# Patient Record
Sex: FEMALE | Race: AMERICAN INDIAN OR ALASKA NATIVE | NOT HISPANIC OR LATINO | Employment: UNEMPLOYED | URBAN - METROPOLITAN AREA
[De-identification: names, ages, dates, MRNs, and addresses within clinical notes are randomized per-mention and may not be internally consistent; named-entity substitution may affect disease eponyms.]

---

## 2022-08-15 PROBLEM — O26.893 RH NEGATIVE STATUS DURING PREGNANCY, THIRD TRIMESTER, SINGLE GESTATION: Status: ACTIVE | Noted: 2022-08-15

## 2022-08-15 PROBLEM — Z67.91 RH NEGATIVE STATUS DURING PREGNANCY, THIRD TRIMESTER, SINGLE GESTATION: Status: ACTIVE | Noted: 2022-08-15

## 2022-08-15 PROBLEM — O99.013 ANEMIA AFFECTING PREGNANCY IN THIRD TRIMESTER: Status: ACTIVE | Noted: 2022-08-15

## 2022-09-14 ENCOUNTER — HOSPITAL ENCOUNTER (EMERGENCY)
Facility: HOSPITAL | Age: 27
Discharge: PSYCHIATRIC OR SUBSTANCE USE FACILITY | End: 2022-09-14
Attending: STUDENT IN AN ORGANIZED HEALTH CARE EDUCATION/TRAINING PROGRAM
Payer: COMMERCIAL

## 2022-09-14 ENCOUNTER — HOSPITAL ENCOUNTER (INPATIENT)
Facility: HOSPITAL | Age: 27
LOS: 7 days | Discharge: HOME | DRG: 885 | End: 2022-09-21
Attending: STUDENT IN AN ORGANIZED HEALTH CARE EDUCATION/TRAINING PROGRAM | Admitting: STUDENT IN AN ORGANIZED HEALTH CARE EDUCATION/TRAINING PROGRAM
Payer: COMMERCIAL

## 2022-09-14 VITALS
BODY MASS INDEX: 36.29 KG/M2 | TEMPERATURE: 98 F | RESPIRATION RATE: 18 BRPM | OXYGEN SATURATION: 98 % | SYSTOLIC BLOOD PRESSURE: 141 MMHG | WEIGHT: 224.88 LBS | DIASTOLIC BLOOD PRESSURE: 76 MMHG | HEART RATE: 102 BPM

## 2022-09-14 DIAGNOSIS — F29 PSYCHOSIS, UNSPECIFIED PSYCHOSIS TYPE: Primary | ICD-10-CM

## 2022-09-14 DIAGNOSIS — Z00.8 MEDICAL CLEARANCE FOR PSYCHIATRIC ADMISSION: Primary | ICD-10-CM

## 2022-09-14 DIAGNOSIS — F30.9 MANIA: ICD-10-CM

## 2022-09-14 DIAGNOSIS — F23 ACUTE PSYCHOSIS: ICD-10-CM

## 2022-09-14 LAB
ALBUMIN SERPL BCP-MCNC: 3.6 G/DL (ref 3.5–5.2)
ALP SERPL-CCNC: 111 U/L (ref 55–135)
ALT SERPL W/O P-5'-P-CCNC: 77 U/L (ref 10–44)
AMPHET+METHAMPHET UR QL: NEGATIVE
ANION GAP SERPL CALC-SCNC: 9 MMOL/L (ref 8–16)
APAP SERPL-MCNC: <3 UG/ML (ref 10–20)
AST SERPL-CCNC: 46 U/L (ref 10–40)
B-HCG UR QL: NEGATIVE
BARBITURATES UR QL SCN>200 NG/ML: NEGATIVE
BASOPHILS # BLD AUTO: 0.08 K/UL (ref 0–0.2)
BASOPHILS NFR BLD: 0.7 % (ref 0–1.9)
BENZODIAZ UR QL SCN>200 NG/ML: NEGATIVE
BILIRUB SERPL-MCNC: 0.3 MG/DL (ref 0.1–1)
BILIRUB UR QL STRIP: NEGATIVE
BUN SERPL-MCNC: 14 MG/DL (ref 6–20)
BZE UR QL SCN: NEGATIVE
CALCIUM SERPL-MCNC: 9.2 MG/DL (ref 8.7–10.5)
CANNABINOIDS UR QL SCN: NEGATIVE
CHLORIDE SERPL-SCNC: 108 MMOL/L (ref 95–110)
CLARITY UR: CLEAR
CO2 SERPL-SCNC: 22 MMOL/L (ref 23–29)
COLOR UR: YELLOW
CREAT SERPL-MCNC: 0.8 MG/DL (ref 0.5–1.4)
CREAT UR-MCNC: 17.7 MG/DL (ref 15–325)
DIFFERENTIAL METHOD: ABNORMAL
EOSINOPHIL # BLD AUTO: 0.5 K/UL (ref 0–0.5)
EOSINOPHIL NFR BLD: 4.7 % (ref 0–8)
ERYTHROCYTE [DISTWIDTH] IN BLOOD BY AUTOMATED COUNT: 15.6 % (ref 11.5–14.5)
EST. GFR  (NO RACE VARIABLE): >60 ML/MIN/1.73 M^2
ESTIMATED AVG GLUCOSE: 100 MG/DL (ref 68–131)
ETHANOL SERPL-MCNC: <10 MG/DL
GLUCOSE SERPL-MCNC: 86 MG/DL (ref 70–110)
GLUCOSE UR QL STRIP: NEGATIVE
HBA1C MFR BLD: 5.1 % (ref 4–5.6)
HCT VFR BLD AUTO: 41.7 % (ref 37–48.5)
HGB BLD-MCNC: 13.2 G/DL (ref 12–16)
HGB UR QL STRIP: NEGATIVE
IMM GRANULOCYTES # BLD AUTO: 0.06 K/UL (ref 0–0.04)
IMM GRANULOCYTES NFR BLD AUTO: 0.5 % (ref 0–0.5)
KETONES UR QL STRIP: NEGATIVE
LEUKOCYTE ESTERASE UR QL STRIP: ABNORMAL
LYMPHOCYTES # BLD AUTO: 3.5 K/UL (ref 1–4.8)
LYMPHOCYTES NFR BLD: 31.2 % (ref 18–48)
MCH RBC QN AUTO: 27.7 PG (ref 27–31)
MCHC RBC AUTO-ENTMCNC: 31.7 G/DL (ref 32–36)
MCV RBC AUTO: 87 FL (ref 82–98)
METHADONE UR QL SCN>300 NG/ML: NEGATIVE
MICROSCOPIC COMMENT: NORMAL
MONOCYTES # BLD AUTO: 0.7 K/UL (ref 0.3–1)
MONOCYTES NFR BLD: 6.6 % (ref 4–15)
NEUTROPHILS # BLD AUTO: 6.3 K/UL (ref 1.8–7.7)
NEUTROPHILS NFR BLD: 56.3 % (ref 38–73)
NITRITE UR QL STRIP: NEGATIVE
NRBC BLD-RTO: 0 /100 WBC
OPIATES UR QL SCN: NEGATIVE
PCP UR QL SCN>25 NG/ML: NEGATIVE
PH UR STRIP: 6 [PH] (ref 5–8)
PLATELET # BLD AUTO: 269 K/UL (ref 150–450)
PMV BLD AUTO: 8.7 FL (ref 9.2–12.9)
POTASSIUM SERPL-SCNC: 4.1 MMOL/L (ref 3.5–5.1)
PROT SERPL-MCNC: 7.6 G/DL (ref 6–8.4)
PROT UR QL STRIP: NEGATIVE
RBC # BLD AUTO: 4.77 M/UL (ref 4–5.4)
RBC #/AREA URNS HPF: 1 /HPF (ref 0–4)
SALICYLATES SERPL-MCNC: <5 MG/DL (ref 15–30)
SARS-COV-2 RDRP RESP QL NAA+PROBE: NEGATIVE
SODIUM SERPL-SCNC: 139 MMOL/L (ref 136–145)
SP GR UR STRIP: <=1.005 (ref 1–1.03)
SQUAMOUS #/AREA URNS HPF: 2 /HPF
TOXICOLOGY INFORMATION: NORMAL
TSH SERPL DL<=0.005 MIU/L-ACNC: 1.33 UIU/ML (ref 0.4–4)
URN SPEC COLLECT METH UR: ABNORMAL
UROBILINOGEN UR STRIP-ACNC: NEGATIVE EU/DL
VALPROATE SERPL-MCNC: <12.5 UG/ML (ref 50–100)
WBC # BLD AUTO: 11.17 K/UL (ref 3.9–12.7)
WBC #/AREA URNS HPF: 2 /HPF (ref 0–5)

## 2022-09-14 PROCEDURE — 80179 DRUG ASSAY SALICYLATE: CPT | Performed by: STUDENT IN AN ORGANIZED HEALTH CARE EDUCATION/TRAINING PROGRAM

## 2022-09-14 PROCEDURE — 83036 HEMOGLOBIN GLYCOSYLATED A1C: CPT | Performed by: STUDENT IN AN ORGANIZED HEALTH CARE EDUCATION/TRAINING PROGRAM

## 2022-09-14 PROCEDURE — 80053 COMPREHEN METABOLIC PANEL: CPT | Performed by: STUDENT IN AN ORGANIZED HEALTH CARE EDUCATION/TRAINING PROGRAM

## 2022-09-14 PROCEDURE — 11400000 HC PSYCH PRIVATE ROOM

## 2022-09-14 PROCEDURE — 99285 EMERGENCY DEPT VISIT HI MDM: CPT | Mod: 25

## 2022-09-14 PROCEDURE — 80061 LIPID PANEL: CPT | Performed by: STUDENT IN AN ORGANIZED HEALTH CARE EDUCATION/TRAINING PROGRAM

## 2022-09-14 PROCEDURE — 85025 COMPLETE CBC W/AUTO DIFF WBC: CPT | Performed by: STUDENT IN AN ORGANIZED HEALTH CARE EDUCATION/TRAINING PROGRAM

## 2022-09-14 PROCEDURE — 80143 DRUG ASSAY ACETAMINOPHEN: CPT | Performed by: STUDENT IN AN ORGANIZED HEALTH CARE EDUCATION/TRAINING PROGRAM

## 2022-09-14 PROCEDURE — 25000003 PHARM REV CODE 250: Performed by: STUDENT IN AN ORGANIZED HEALTH CARE EDUCATION/TRAINING PROGRAM

## 2022-09-14 PROCEDURE — 81000 URINALYSIS NONAUTO W/SCOPE: CPT | Mod: 59 | Performed by: STUDENT IN AN ORGANIZED HEALTH CARE EDUCATION/TRAINING PROGRAM

## 2022-09-14 PROCEDURE — 81025 URINE PREGNANCY TEST: CPT | Performed by: STUDENT IN AN ORGANIZED HEALTH CARE EDUCATION/TRAINING PROGRAM

## 2022-09-14 PROCEDURE — 63600175 PHARM REV CODE 636 W HCPCS: Performed by: STUDENT IN AN ORGANIZED HEALTH CARE EDUCATION/TRAINING PROGRAM

## 2022-09-14 PROCEDURE — 84443 ASSAY THYROID STIM HORMONE: CPT | Performed by: STUDENT IN AN ORGANIZED HEALTH CARE EDUCATION/TRAINING PROGRAM

## 2022-09-14 PROCEDURE — 80307 DRUG TEST PRSMV CHEM ANLYZR: CPT | Performed by: STUDENT IN AN ORGANIZED HEALTH CARE EDUCATION/TRAINING PROGRAM

## 2022-09-14 PROCEDURE — 36415 COLL VENOUS BLD VENIPUNCTURE: CPT | Performed by: STUDENT IN AN ORGANIZED HEALTH CARE EDUCATION/TRAINING PROGRAM

## 2022-09-14 PROCEDURE — 96372 THER/PROPH/DIAG INJ SC/IM: CPT | Performed by: STUDENT IN AN ORGANIZED HEALTH CARE EDUCATION/TRAINING PROGRAM

## 2022-09-14 PROCEDURE — 82077 ASSAY SPEC XCP UR&BREATH IA: CPT | Performed by: STUDENT IN AN ORGANIZED HEALTH CARE EDUCATION/TRAINING PROGRAM

## 2022-09-14 PROCEDURE — U0002 COVID-19 LAB TEST NON-CDC: HCPCS | Performed by: STUDENT IN AN ORGANIZED HEALTH CARE EDUCATION/TRAINING PROGRAM

## 2022-09-14 PROCEDURE — 80164 ASSAY DIPROPYLACETIC ACD TOT: CPT | Performed by: STUDENT IN AN ORGANIZED HEALTH CARE EDUCATION/TRAINING PROGRAM

## 2022-09-14 RX ORDER — THIAMINE HCL 100 MG
100 TABLET ORAL DAILY
Status: DISCONTINUED | OUTPATIENT
Start: 2022-09-14 | End: 2022-09-21 | Stop reason: HOSPADM

## 2022-09-14 RX ORDER — LORAZEPAM 1 MG/1
2 TABLET ORAL EVERY 4 HOURS PRN
Status: DISCONTINUED | OUTPATIENT
Start: 2022-09-14 | End: 2022-09-16

## 2022-09-14 RX ORDER — QUETIAPINE FUMARATE 300 MG/1
300 TABLET, FILM COATED ORAL NIGHTLY
Status: ON HOLD | COMMUNITY
Start: 2022-09-07 | End: 2022-09-21 | Stop reason: HOSPADM

## 2022-09-14 RX ORDER — ZOLPIDEM TARTRATE 10 MG/1
10 TABLET ORAL NIGHTLY
Status: ON HOLD | COMMUNITY
Start: 2022-09-07 | End: 2022-09-21 | Stop reason: HOSPADM

## 2022-09-14 RX ORDER — HALOPERIDOL 5 MG/1
5 TABLET ORAL 2 TIMES DAILY
Status: ON HOLD | COMMUNITY
Start: 2022-09-09 | End: 2022-09-21 | Stop reason: HOSPADM

## 2022-09-14 RX ORDER — SERTRALINE HYDROCHLORIDE 50 MG/1
TABLET, FILM COATED ORAL
Status: ON HOLD | COMMUNITY
Start: 2022-08-22 | End: 2022-09-21 | Stop reason: HOSPADM

## 2022-09-14 RX ORDER — HALOPERIDOL 5 MG/ML
5 INJECTION INTRAMUSCULAR EVERY 4 HOURS PRN
Status: DISCONTINUED | OUTPATIENT
Start: 2022-09-14 | End: 2022-09-16

## 2022-09-14 RX ORDER — CHLORPROMAZINE HYDROCHLORIDE 50 MG/1
1 TABLET, FILM COATED ORAL EVERY MORNING
Status: ON HOLD | COMMUNITY
Start: 2022-09-08 | End: 2022-09-21 | Stop reason: HOSPADM

## 2022-09-14 RX ORDER — DIVALPROEX SODIUM 500 MG/1
500 TABLET, DELAYED RELEASE ORAL 3 TIMES DAILY
Status: ON HOLD | COMMUNITY
Start: 2022-09-07 | End: 2022-09-21 | Stop reason: HOSPADM

## 2022-09-14 RX ORDER — IBUPROFEN 200 MG
1 TABLET ORAL DAILY PRN
Status: DISCONTINUED | OUTPATIENT
Start: 2022-09-14 | End: 2022-09-21 | Stop reason: HOSPADM

## 2022-09-14 RX ORDER — HYDROXYZINE HYDROCHLORIDE 25 MG/1
50 TABLET, FILM COATED ORAL NIGHTLY PRN
Status: DISCONTINUED | OUTPATIENT
Start: 2022-09-14 | End: 2022-09-21 | Stop reason: HOSPADM

## 2022-09-14 RX ORDER — CALCIUM CARBONATE 200(500)MG
1000 TABLET,CHEWABLE ORAL EVERY 8 HOURS PRN
Status: DISCONTINUED | OUTPATIENT
Start: 2022-09-14 | End: 2022-09-21 | Stop reason: HOSPADM

## 2022-09-14 RX ORDER — HALOPERIDOL 5 MG/ML
5 INJECTION INTRAMUSCULAR EVERY 4 HOURS PRN
Status: DISCONTINUED | OUTPATIENT
Start: 2022-09-14 | End: 2022-09-14 | Stop reason: HOSPADM

## 2022-09-14 RX ORDER — DIPHENHYDRAMINE HYDROCHLORIDE 50 MG/ML
50 INJECTION INTRAMUSCULAR; INTRAVENOUS EVERY 4 HOURS PRN
Status: DISCONTINUED | OUTPATIENT
Start: 2022-09-14 | End: 2022-09-14

## 2022-09-14 RX ORDER — PAROXETINE HYDROCHLORIDE 20 MG/1
20 TABLET, FILM COATED ORAL DAILY
Status: ON HOLD | COMMUNITY
Start: 2022-09-07 | End: 2022-09-21 | Stop reason: HOSPADM

## 2022-09-14 RX ORDER — ADHESIVE BANDAGE
30 BANDAGE TOPICAL DAILY PRN
Status: DISCONTINUED | OUTPATIENT
Start: 2022-09-14 | End: 2022-09-21 | Stop reason: HOSPADM

## 2022-09-14 RX ORDER — LORAZEPAM 2 MG/ML
2 INJECTION INTRAMUSCULAR EVERY 4 HOURS PRN
Status: DISCONTINUED | OUTPATIENT
Start: 2022-09-14 | End: 2022-09-14 | Stop reason: HOSPADM

## 2022-09-14 RX ORDER — HALOPERIDOL 5 MG/1
5 TABLET ORAL EVERY 4 HOURS PRN
Status: DISCONTINUED | OUTPATIENT
Start: 2022-09-14 | End: 2022-09-16

## 2022-09-14 RX ORDER — DIPHENHYDRAMINE HYDROCHLORIDE 50 MG/ML
50 INJECTION INTRAMUSCULAR; INTRAVENOUS EVERY 4 HOURS PRN
Status: DISCONTINUED | OUTPATIENT
Start: 2022-09-14 | End: 2022-09-14 | Stop reason: HOSPADM

## 2022-09-14 RX ORDER — FOLIC ACID 1 MG/1
1 TABLET ORAL DAILY
Status: DISCONTINUED | OUTPATIENT
Start: 2022-09-14 | End: 2022-09-21 | Stop reason: HOSPADM

## 2022-09-14 RX ORDER — DIPHENHYDRAMINE HCL 50 MG
50 CAPSULE ORAL EVERY 4 HOURS PRN
Status: DISCONTINUED | OUTPATIENT
Start: 2022-09-14 | End: 2022-09-16

## 2022-09-14 RX ORDER — HALOPERIDOL 5 MG/ML
5 INJECTION INTRAMUSCULAR EVERY 4 HOURS PRN
Status: DISCONTINUED | OUTPATIENT
Start: 2022-09-14 | End: 2022-09-14

## 2022-09-14 RX ORDER — LORAZEPAM 2 MG/ML
2 INJECTION INTRAMUSCULAR EVERY 4 HOURS PRN
Status: DISCONTINUED | OUTPATIENT
Start: 2022-09-14 | End: 2022-09-16

## 2022-09-14 RX ORDER — DIPHENHYDRAMINE HYDROCHLORIDE 50 MG/ML
50 INJECTION INTRAMUSCULAR; INTRAVENOUS EVERY 4 HOURS PRN
Status: DISCONTINUED | OUTPATIENT
Start: 2022-09-14 | End: 2022-09-16

## 2022-09-14 RX ADMIN — HYDROXYZINE HYDROCHLORIDE 50 MG: 25 TABLET ORAL at 08:09

## 2022-09-14 RX ADMIN — Medication 100 MG: at 03:09

## 2022-09-14 RX ADMIN — LORAZEPAM 2 MG: 2 INJECTION INTRAMUSCULAR; INTRAVENOUS at 11:09

## 2022-09-14 RX ADMIN — DIPHENHYDRAMINE HYDROCHLORIDE 50 MG: 50 INJECTION INTRAMUSCULAR; INTRAVENOUS at 11:09

## 2022-09-14 RX ADMIN — FOLIC ACID 1 MG: 1 TABLET ORAL at 03:09

## 2022-09-14 RX ADMIN — HALOPERIDOL LACTATE 5 MG: 5 INJECTION, SOLUTION INTRAMUSCULAR at 11:09

## 2022-09-14 RX ADMIN — THERA TABS 1 TABLET: TAB at 03:09

## 2022-09-14 NOTE — PLAN OF CARE
"Admit Note:  Pt received from [facility] ER, pec'd.  Pt escorted to u by security and tech via wheelchair.  Pt wanded and ambulatory on unit.  VSS.  Skin assessment/body search complete, nothing to report.  Pt calm and cooperative during admissions assessment.  States she does not understand why she is here and wants to leave, but pt was easily redirected after explaining pec process.  Pt states she was in an argument with her sister after a family member just recently , and she said some things that some of her family thought was "weird".  Stated she just made a comment about death and it was taken the wrong way.  Pt does have a long psych hx with previous inpatient admits.  Pt recently had a baby and has been off most of her previous psych meds.  Pt had agitated behavior in the ER and was given IM meds for agitation.  Somewhat subdued on u but alert and oriented.  Pt singed all paperwork.  Explained rules of unit and instructed pt to inform staff of any needs or concerns, understanding verbalized.  Pt oriented to unit.  Will continue to monitor.  "

## 2022-09-14 NOTE — ED NOTES
Pt requiring frequent redirection, patient requiring multiple staff members present for elopement precautions, pt yelling profanity, unable to redirect, md notified, new orders noted.

## 2022-09-14 NOTE — ED NOTES
Pt  transported to [facility] via , report given  to [place] [facility].  Pct and security present for transport and belongings sent with PCT.  NAD.

## 2022-09-14 NOTE — ED PROVIDER NOTES
"Encounter Date: 2022    This document was partially completed using speech recognition software and may contain misspellings, grammatical errors, and/or unexpected word substitutions.       History     Chief Complaint   Patient presents with    Psychiatric Evaluation     Pt reports "Im tired of people calling me crazy and I just need to prove I'm not". Pt states "I need to get out of the system". Pt with flight of ideas in triage.      27 year old female , last delivered last month presents to the ED with her father for psychosis. Starting a month ago after she delivered, she started acting very erratic. Not sleeping, not eating/drinking much, talking randomness, difficult to interact with. She has been taking care of her  per dad but he expresses concerns that she takes him on walks next to the road. This occurred after her 2nd pregnancy and she had to be hospitalized for 3 months per mom. Dad also states she was wishing someone else would die today but didn't make a plan to harm them.    The patient is alert, oriented. Claiming to be part of the [place] [employer] of [place]. Agitated, not cooperative, hyperverbal, stating "fu** the sytem" and that she needs to leave.     Review of patient's allergies indicates:  No Known Allergies  History reviewed. No pertinent past medical history.  History reviewed. No pertinent surgical history.  History reviewed. No pertinent family history.  Social History     Tobacco Use    Smoking status: Never    Smokeless tobacco: Never   Substance Use Topics    Alcohol use: Not Currently     Review of Systems   Unable to perform ROS: Psychiatric disorder     Physical Exam     Initial Vitals [22 1044]   BP Pulse Resp Temp SpO2   (!) 141/76 102 18 98.4 °F (36.9 °C) 98 %      MAP       --         Physical Exam    Nursing note and vitals reviewed.  Constitutional: She appears well-developed. She is not diaphoretic. No distress.   HENT:   Head: Normocephalic and atraumatic. "   Right Ear: External ear normal.   Left Ear: External ear normal.   Eyes: Right eye exhibits no discharge. Left eye exhibits no discharge. No scleral icterus.   Neck: Neck supple.   Cardiovascular:  Normal rate and regular rhythm.           Pulmonary/Chest: Breath sounds normal. No stridor. No respiratory distress. She has no wheezes. She has no rhonchi. She has no rales.   Abdominal: Abdomen is soft. There is no abdominal tenderness. There is no guarding.   Musculoskeletal:         General: No edema.      Cervical back: Neck supple.     Neurological: She is alert and oriented to person, place, and time.   Skin: Skin is warm and dry. Capillary refill takes less than 2 seconds.   Psychiatric: Her affect is angry. Her speech is rapid and/or pressured. She is agitated, aggressive and hyperactive. Thought content is paranoid. Cognition and memory are impaired. She expresses homicidal ideation. She expresses no suicidal ideation. She expresses no suicidal plans and no homicidal plans.       ED Course   Procedures  Labs Reviewed   URINALYSIS, REFLEX TO URINE CULTURE - Abnormal; Notable for the following components:       Result Value    Specific Gravity, UA <=1.005 (*)     Leukocytes, UA Trace (*)     All other components within normal limits    Narrative:     Specimen Source->Urine   ACETAMINOPHEN LEVEL - Abnormal; Notable for the following components:    Acetaminophen (Tylenol), Serum <3.0 (*)     All other components within normal limits   COMPREHENSIVE METABOLIC PANEL - Abnormal; Notable for the following components:    CO2 22 (*)     AST 46 (*)     ALT 77 (*)     All other components within normal limits   CBC W/ AUTO DIFFERENTIAL - Abnormal; Notable for the following components:    MCHC 31.7 (*)     RDW 15.6 (*)     MPV 8.7 (*)     Immature Grans (Abs) 0.06 (*)     All other components within normal limits   SALICYLATE LEVEL - Abnormal; Notable for the following components:    Salicylate Lvl <5.0 (*)     All other  components within normal limits   VALPROIC ACID - Abnormal; Notable for the following components:    Valproic Acid Level <12.5 (*)     All other components within normal limits   ALCOHOL,MEDICAL (ETHANOL)   TSH   DRUG SCREEN PANEL, URINE EMERGENCY    Narrative:     Specimen Source->Urine   PREGNANCY TEST, URINE RAPID    Narrative:     Specimen Source->Urine   SARS-COV-2 RNA AMPLIFICATION, QUAL   URINALYSIS MICROSCOPIC    Narrative:     Specimen Source->Urine          Imaging Results    None          Medications   lorazepam injection 2 mg (2 mg Intramuscular Not Given 22 1127)   haloperidol lactate injection 5 mg (5 mg Intramuscular Given 22 1110)   diphenhydrAMINE injection 50 mg (50 mg Intramuscular Given 22 1114)     Medical Decision Making:   Differential Diagnosis:   Ddx: postpartum psychosis, grave disability, SI, HI  ED Management:  Based on the patient's evaluation - patient appears to have psychosis and manic and meets PEC criteria for grave disability. Father agrees. The  child is formula milk fed and is with the father and mother-in-law and will care for the child. Patient is very upset with being PEC but I informed her that she will be going to inpatient psych.               Medically cleared for psychiatry placement: 2022  1:01 PM         Clinical Impression:   Final diagnoses:  [Z00.8] Medical clearance for psychiatric admission (Primary)  [F23] Acute psychosis  [O99.345, F53.1] Postpartum psychosis      ED Disposition Condition    Transfer to Psych Facility Stable          ED Prescriptions    None       Follow-up Information    None          Beto Pacheco DO  [id number]

## 2022-09-15 PROBLEM — F29 PSYCHOSIS: Status: ACTIVE | Noted: 2022-09-15

## 2022-09-15 LAB
CHOLEST SERPL-MCNC: 201 MG/DL (ref 120–199)
CHOLEST/HDLC SERPL: 4.3 {RATIO} (ref 2–5)
HDLC SERPL-MCNC: 47 MG/DL (ref 40–75)
HDLC SERPL: 23.4 % (ref 20–50)
LDLC SERPL CALC-MCNC: 121.6 MG/DL (ref 63–159)
NONHDLC SERPL-MCNC: 154 MG/DL
TRIGL SERPL-MCNC: 162 MG/DL (ref 30–150)

## 2022-09-15 PROCEDURE — 99223 PR INITIAL HOSPITAL CARE,LEVL III: ICD-10-PCS | Mod: ,,, | Performed by: STUDENT IN AN ORGANIZED HEALTH CARE EDUCATION/TRAINING PROGRAM

## 2022-09-15 PROCEDURE — 25000003 PHARM REV CODE 250: Performed by: STUDENT IN AN ORGANIZED HEALTH CARE EDUCATION/TRAINING PROGRAM

## 2022-09-15 PROCEDURE — 99232 SBSQ HOSP IP/OBS MODERATE 35: CPT | Mod: ,,, | Performed by: NURSE PRACTITIONER

## 2022-09-15 PROCEDURE — 99222 PR INITIAL HOSPITAL CARE,LEVL II: ICD-10-PCS | Mod: ,,, | Performed by: NURSE PRACTITIONER

## 2022-09-15 PROCEDURE — 36415 COLL VENOUS BLD VENIPUNCTURE: CPT | Performed by: STUDENT IN AN ORGANIZED HEALTH CARE EDUCATION/TRAINING PROGRAM

## 2022-09-15 PROCEDURE — 99222 1ST HOSP IP/OBS MODERATE 55: CPT | Mod: ,,, | Performed by: NURSE PRACTITIONER

## 2022-09-15 PROCEDURE — 99232 PR SUBSEQUENT HOSPITAL CARE,LEVL II: ICD-10-PCS | Mod: ,,, | Performed by: NURSE PRACTITIONER

## 2022-09-15 PROCEDURE — 99223 1ST HOSP IP/OBS HIGH 75: CPT | Mod: ,,, | Performed by: STUDENT IN AN ORGANIZED HEALTH CARE EDUCATION/TRAINING PROGRAM

## 2022-09-15 PROCEDURE — 11400000 HC PSYCH PRIVATE ROOM

## 2022-09-15 RX ORDER — QUETIAPINE FUMARATE 200 MG/1
400 TABLET, FILM COATED ORAL NIGHTLY
Status: DISCONTINUED | OUTPATIENT
Start: 2022-09-15 | End: 2022-09-16

## 2022-09-15 RX ORDER — HYDROCORTISONE 25 MG/G
CREAM TOPICAL 2 TIMES DAILY
Status: DISCONTINUED | OUTPATIENT
Start: 2022-09-15 | End: 2022-09-21 | Stop reason: HOSPADM

## 2022-09-15 RX ADMIN — HYDROCORTISONE 2.5%: 25 CREAM TOPICAL at 02:09

## 2022-09-15 RX ADMIN — HYDROCORTISONE 2.5%: 25 CREAM TOPICAL at 08:09

## 2022-09-15 RX ADMIN — Medication 100 MG: at 08:09

## 2022-09-15 RX ADMIN — QUETIAPINE FUMARATE 400 MG: 200 TABLET ORAL at 08:09

## 2022-09-15 RX ADMIN — THERA TABS 1 TABLET: TAB at 08:09

## 2022-09-15 RX ADMIN — FOLIC ACID 1 MG: 1 TABLET ORAL at 08:09
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
## 2022-09-15 NOTE — PLAN OF CARE
"Asked female tech "Where's the exits at"?  Calm when she asked.   Interacting with peers.  Accepted snacks.  Prn hydroxyzine given at 20:07 to assist with insomnia.  Complained about hemorrhoids.  Modified VC maintained.    "

## 2022-09-15 NOTE — PLAN OF CARE
"  Problem: Adult Behavioral Health Plan of Care  Goal: Optimized Coping Skills in Response to Life Stressors  Outcome: Ongoing, Progressing  Intervention: Promote Effective Coping Strategies  Flowsheets (Taken 9/15/2022 5579)  Supportive Measures:   active listening utilized   positive reinforcement provided   counseling provided   verbalization of feelings encouraged   Group Note:  Behavior:  PLPC met with patient for an individual psychotherapy group. Pt affect elevated with dysphoric mood.     Intervention: PLPC discussed with patient on discharge planning. PLPC encouraged patient to identify ways to cope with and reflect on what are the plans when they are discharged ?    Response:  Pt stated  I am going back home to take care of my baby".    Plan: Continue to encourage group attendance in future group sessions.  "

## 2022-09-15 NOTE — SUBJECTIVE & OBJECTIVE
No past medical history on file.    No past surgical history on file.    Review of patient's allergies indicates:  No Known Allergies    Current Facility-Administered Medications on File Prior to Encounter   Medication    [DISCONTINUED] diphenhydrAMINE injection 50 mg    [DISCONTINUED] haloperidol lactate injection 5 mg    [DISCONTINUED] lorazepam injection 2 mg     Current Outpatient Medications on File Prior to Encounter   Medication Sig    chlorproMAZINE (THORAZINE) 50 MG tablet Take 1 tablet by mouth every morning.    divalproex (DEPAKOTE) 500 MG TbEC Take 500 mg by mouth 3 (three) times daily.    haloperidoL (HALDOL) 5 MG tablet Take 5 mg by mouth 2 (two) times daily.    paroxetine (PAXIL) 20 MG tablet Take 20 mg by mouth once daily.    QUEtiapine (SEROQUEL) 300 MG Tab Take 300 mg by mouth every evening.    sertraline (ZOLOFT) 50 MG tablet     zolpidem (AMBIEN) 10 mg Tab Take 10 mg by mouth every evening.     Family History    None       Tobacco Use    Smoking status: Never    Smokeless tobacco: Never   Substance and Sexual Activity    Alcohol use: Not Currently    Drug use: Not on file    Sexual activity: Yes     Partners: Male     Review of Systems   Constitutional:  Negative for chills, fatigue, fever and unexpected weight change.   HENT:  Negative for congestion, ear pain, sore throat and trouble swallowing.    Eyes:  Negative for pain and visual disturbance.   Respiratory:  Negative for cough, chest tightness and shortness of breath.    Cardiovascular:  Negative for chest pain, palpitations and leg swelling.   Gastrointestinal:  Negative for abdominal distention, abdominal pain, constipation, diarrhea and vomiting.   Genitourinary:  Negative for difficulty urinating, dysuria, flank pain, frequency and hematuria.   Musculoskeletal:  Negative for back pain, gait problem, joint swelling, neck pain and neck stiffness.   Skin:  Negative for rash and wound.   Neurological:  Negative for dizziness, seizures,  speech difficulty, light-headedness and headaches.   Psychiatric/Behavioral:  Positive for decreased concentration.    Objective:     Vital Signs (Most Recent):  Temp: 96.7 °F (35.9 °C) (09/15/22 0722)  Pulse: 70 (09/15/22 0722)  Resp: 16 (09/15/22 0722)  BP: (!) 116/58 (09/15/22 0722)   Vital Signs (24h Range):  Temp:  [96.7 °F (35.9 °C)-97.4 °F (36.3 °C)] 96.7 °F (35.9 °C)  Pulse:  [63-70] 70  Resp:  [16-18] 16  BP: (115-120)/(58-64) 116/58     Weight: 101.9 kg (224 lb 12.1 oz)  Body mass index is 36.28 kg/m².    Physical Exam  Vitals reviewed.   Constitutional:       Appearance: She is well-developed.   HENT:      Head: Normocephalic and atraumatic.   Neck:      Thyroid: No thyromegaly.   Cardiovascular:      Rate and Rhythm: Normal rate and regular rhythm.      Heart sounds: Normal heart sounds. No murmur heard.  Pulmonary:      Effort: Pulmonary effort is normal. No respiratory distress.      Breath sounds: Normal breath sounds. No wheezing or rales.   Abdominal:      General: Bowel sounds are normal. There is no distension.      Palpations: Abdomen is soft. There is no mass.      Tenderness: There is no abdominal tenderness.   Musculoskeletal:         General: Normal range of motion.   Lymphadenopathy:      Cervical: No cervical adenopathy.   Skin:     General: Skin is warm and dry.      Findings: No erythema.   Neurological:      Mental Status: She is alert and oriented to person, place, and time.      Comments:   Neuro: Cranial nerves:  CN II Visual fields full to confrontation.   CN III, IV, VI Pupils are equal, round, and reactive to light.  CN III: no palsy  Nystagmus: none   Diplopia: none  Ophthalmoparesis: none  CN V Facial sensation intact.   CN VII Facial expression full, symmetric.   CN VIII normal.   CN IX normal.   CN X normal.   CN XI normal.   CN XII normal.             Significant Labs:   UPT negative     U/A  trace leuko    UDS  Results for orders placed or performed during the hospital  encounter of 09/14/22   Drug screen panel, emergency   Result Value Ref Range    Benzodiazepines Negative Negative    Methadone metabolites Negative Negative    Cocaine (Metab.) Negative Negative    Opiate Scrn, Ur Negative Negative    Barbiturate Screen, Ur Negative Negative    Amphetamine Screen, Ur Negative Negative    THC Negative Negative    Phencyclidine Negative Negative    Creatinine, Urine 17.7 15.0 - 325.0 mg/dL    Toxicology Information SEE COMMENT      CBC  Results for orders placed or performed during the hospital encounter of 09/14/22   CBC auto differential   Result Value Ref Range    WBC 11.17 3.90 - 12.70 K/uL    RBC 4.77 4.00 - 5.40 M/uL    Hemoglobin 13.2 12.0 - 16.0 g/dL    Hematocrit 41.7 37.0 - 48.5 %    MCV 87 82 - 98 fL    MCH 27.7 27.0 - 31.0 pg    MCHC 31.7 (L) 32.0 - 36.0 g/dL    RDW 15.6 (H) 11.5 - 14.5 %    Platelets 269 150 - 450 K/uL    MPV 8.7 (L) 9.2 - 12.9 fL    Immature Granulocytes 0.5 0.0 - 0.5 %    Gran # (ANC) 6.3 1.8 - 7.7 K/uL    Immature Grans (Abs) 0.06 (H) 0.00 - 0.04 K/uL    Lymph # 3.5 1.0 - 4.8 K/uL    Mono # 0.7 0.3 - 1.0 K/uL    Eos # 0.5 0.0 - 0.5 K/uL    Baso # 0.08 0.00 - 0.20 K/uL    nRBC 0 0 /100 WBC    Gran % 56.3 38.0 - 73.0 %    Lymph % 31.2 18.0 - 48.0 %    Mono % 6.6 4.0 - 15.0 %    Eosinophil % 4.7 0.0 - 8.0 %    Basophil % 0.7 0.0 - 1.9 %    Differential Method Automated      CMP  Results for orders placed or performed during the hospital encounter of 09/14/22   Comprehensive metabolic panel   Result Value Ref Range    Sodium 139 136 - 145 mmol/L    Potassium 4.1 3.5 - 5.1 mmol/L    Chloride 108 95 - 110 mmol/L    CO2 22 (L) 23 - 29 mmol/L    Glucose 86 70 - 110 mg/dL    BUN 14 6 - 20 mg/dL    Creatinine 0.8 0.5 - 1.4 mg/dL    Calcium 9.2 8.7 - 10.5 mg/dL    Total Protein 7.6 6.0 - 8.4 g/dL    Albumin 3.6 3.5 - 5.2 g/dL    Total Bilirubin 0.3 0.1 - 1.0 mg/dL    Alkaline Phosphatase 111 55 - 135 U/L    AST 46 (H) 10 - 40 U/L    ALT 77 (H) 10 - 44 U/L     Anion Gap 9 8 - 16 mmol/L    eGFR >60 >60 mL/min/1.73 m^2     TSH  Results for orders placed or performed during the hospital encounter of 09/14/22   TSH   Result Value Ref Range    TSH 1.328 0.400 - 4.000 uIU/mL     ETOH  Results for orders placed or performed during the hospital encounter of 09/14/22   Ethanol   Result Value Ref Range    Alcohol, Serum <10 <10 mg/dL     Salicylate  Results for orders placed or performed during the hospital encounter of 09/14/22   Salicylate level   Result Value Ref Range    Salicylate Lvl <5.0 (L) 15.0 - 30.0 mg/dL     Acetaminophen  Results for orders placed or performed during the hospital encounter of 09/14/22   Acetaminophen level   Result Value Ref Range    Acetaminophen (Tylenol), Serum <3.0 (L) 10.0 - 20.0 ug/mL

## 2022-09-15 NOTE — NURSING
Rested intermittently with closed eyes and even resp for 5 hours.  Modified VC and all precautions maintained.  Pathways clear and bed in low position.

## 2022-09-15 NOTE — PROGRESS NOTES
"   09/15/22 1525   Assessment   Patient's Identification of the Problem Patient presents calm, cooperative, reports "calm, relaxed, collective" mood, states her admit is due to "I'm a manic bipolar. I just had my baby on August 14th, everything went wrong. It'a kind of like a switch inside my brain made me so angry. I just felt like everyone was trying to take my son away from me... It's safer for my family if I'm here cause I do things when I'm angry. I act irrational... The fact that I can't breast feed makes me so angry." Patient denies alcohol or drug use. Patient reports she is , have 3 children, has an Associate Degree in General Studies, is unemployed, wears glasses, lives in [place] with her family. Patient verbalized her main goals "I just want to go home to my family and go back to [facility]."   Leisure Interest Video Games;Sit Outside;Enjoy Family/Friends;Listen to Music;Uatsdin  (play with the dog)   Leisure Barriers Lack of Finances   Treatment Focus To Improve Mood;To Promote Successful and Safe Self Expression;To Improve Coping Skills;To Improve Leisure Awareness/Lifestyle/Interest     Treatment Recommendation:   1:1 Intervention (as needed)    Cognitive Stimulation Skilled Activity  Self Expression Skilled Activity  Mild Exercises Skilled Activity  Stress Management Skilled Activity  Coping Skilled Activity  Leisure Education and Awareness Skilled Activity    Treatment Goal(s):  Long Term Goals Refer To Master Treatment Plan    Short Term Treatment Goal(s)  Patient Will:  Comply with Treatment  Exhibit Improvement in Mood  Demonstrate Constructive Expression of Feelings and Behavior  Verbalize Improvement in Mood    Discharge Recommendations:  Encourage Patient to Actively Utilize Available Community Resources to Increase Leisure Involvement to Decrease Signs and Symptoms of Illness  Encourage Patient to Utilize Coping Skills on a Regular Basis to Reduce the Risk of Decompensating and " Re-Hospitalizations  Follow Up with After Care Appointments  Continue with Current Leisure Activities

## 2022-09-15 NOTE — PLAN OF CARE
Pt is somewhat restless and elevated but redirectable.  She denies any S/I or H/I at this time.  Pt has little insight into illness.  Pt does not really want to take meds in order to breastfeed.  Pt has to  be redirected and explained that her baby is on formula.  Explained that if the baby is doing well on formula, the pt should take all meds prescribe in order to make herself better.  Pt verbalized understanding.  She is somewhat distracted and repetitive.  Will continue to monitor and redirect as needed.

## 2022-09-15 NOTE — CONSULTS
[facility] Hospital Medicine  Consult Note    Patient Name: Tesha Macias  MRN: [id number]  Admission Date: 9/14/2022  Hospital Length of Stay: 1 days  Attending Physician: Toni Olson III, MD   Primary Care Provider: Glen Estevez MD           Patient information was obtained from patient and ER records.     Inpatient consult to [facility] for History and Physical  Consult performed by: Daisy Booth NP  Consult ordered by: Toni Olson III, MD        Subjective:     Principal Problem: <principal problem not specified>    Chief Complaint: No chief complaint on file.       HPI: 26yo female patient presented to ER with psychosis. She was admitted to [facility] and medicine was consulted for H/p. VSS/afebrile. Labs reviewed. Elevated lft       No past medical history on file.    No past surgical history on file.    Review of patient's allergies indicates:  No Known Allergies    Current Facility-Administered Medications on File Prior to Encounter   Medication    [DISCONTINUED] diphenhydrAMINE injection 50 mg    [DISCONTINUED] haloperidol lactate injection 5 mg    [DISCONTINUED] lorazepam injection 2 mg     Current Outpatient Medications on File Prior to Encounter   Medication Sig    chlorproMAZINE (THORAZINE) 50 MG tablet Take 1 tablet by mouth every morning.    divalproex (DEPAKOTE) 500 MG TbEC Take 500 mg by mouth 3 (three) times daily.    haloperidoL (HALDOL) 5 MG tablet Take 5 mg by mouth 2 (two) times daily.    paroxetine (PAXIL) 20 MG tablet Take 20 mg by mouth once daily.    QUEtiapine (SEROQUEL) 300 MG Tab Take 300 mg by mouth every evening.    sertraline (ZOLOFT) 50 MG tablet     zolpidem (AMBIEN) 10 mg Tab Take 10 mg by mouth every evening.     Family History    None       Tobacco Use    Smoking status: Never    Smokeless tobacco: Never   Substance and Sexual Activity    Alcohol use: Not Currently    Drug use: Not on file    Sexual activity: Yes     Partners: Male     Review of  Systems   Constitutional:  Negative for chills, fatigue, fever and unexpected weight change.   HENT:  Negative for congestion, ear pain, sore throat and trouble swallowing.    Eyes:  Negative for pain and visual disturbance.   Respiratory:  Negative for cough, chest tightness and shortness of breath.    Cardiovascular:  Negative for chest pain, palpitations and leg swelling.   Gastrointestinal:  Negative for abdominal distention, abdominal pain, constipation, diarrhea and vomiting.   Genitourinary:  Negative for difficulty urinating, dysuria, flank pain, frequency and hematuria.   Musculoskeletal:  Negative for back pain, gait problem, joint swelling, neck pain and neck stiffness.   Skin:  Negative for rash and wound.   Neurological:  Negative for dizziness, seizures, speech difficulty, light-headedness and headaches.   Psychiatric/Behavioral:  Positive for decreased concentration.    Objective:     Vital Signs (Most Recent):  Temp: 96.7 °F (35.9 °C) (09/15/22 0722)  Pulse: 70 (09/15/22 0722)  Resp: 16 (09/15/22 0722)  BP: (!) 116/58 (09/15/22 0722)   Vital Signs (24h Range):  Temp:  [96.7 °F (35.9 °C)-97.4 °F (36.3 °C)] 96.7 °F (35.9 °C)  Pulse:  [63-70] 70  Resp:  [16-18] 16  BP: (115-120)/(58-64) 116/58     Weight: 101.9 kg (224 lb 12.1 oz)  Body mass index is 36.28 kg/m².    Physical Exam  Vitals reviewed.   Constitutional:       Appearance: She is well-developed.   HENT:      Head: Normocephalic and atraumatic.   Neck:      Thyroid: No thyromegaly.   Cardiovascular:      Rate and Rhythm: Normal rate and regular rhythm.      Heart sounds: Normal heart sounds. No murmur heard.  Pulmonary:      Effort: Pulmonary effort is normal. No respiratory distress.      Breath sounds: Normal breath sounds. No wheezing or rales.   Abdominal:      General: Bowel sounds are normal. There is no distension.      Palpations: Abdomen is soft. There is no mass.      Tenderness: There is no abdominal tenderness.   Musculoskeletal:          General: Normal range of motion.   Lymphadenopathy:      Cervical: No cervical adenopathy.   Skin:     General: Skin is warm and dry.      Findings: No erythema.   Neurological:      Mental Status: She is alert and oriented to person, place, and time.      Comments:   Neuro: Cranial nerves:  CN II Visual fields full to confrontation.   CN III, IV, VI Pupils are equal, round, and reactive to light.  CN III: no palsy  Nystagmus: none   Diplopia: none  Ophthalmoparesis: none  CN V Facial sensation intact.   CN VII Facial expression full, symmetric.   CN VIII normal.   CN IX normal.   CN X normal.   CN XI normal.   CN XII normal.             Significant Labs:   UPT negative     U/A  trace leuko    UDS  Results for orders placed or performed during the hospital encounter of 09/14/22   Drug screen panel, emergency   Result Value Ref Range    Benzodiazepines Negative Negative    Methadone metabolites Negative Negative    Cocaine (Metab.) Negative Negative    Opiate Scrn, Ur Negative Negative    Barbiturate Screen, Ur Negative Negative    Amphetamine Screen, Ur Negative Negative    THC Negative Negative    Phencyclidine Negative Negative    Creatinine, Urine 17.7 15.0 - 325.0 mg/dL    Toxicology Information SEE COMMENT      CBC  Results for orders placed or performed during the hospital encounter of 09/14/22   CBC auto differential   Result Value Ref Range    WBC 11.17 3.90 - 12.70 K/uL    RBC 4.77 4.00 - 5.40 M/uL    Hemoglobin 13.2 12.0 - 16.0 g/dL    Hematocrit 41.7 37.0 - 48.5 %    MCV 87 82 - 98 fL    MCH 27.7 27.0 - 31.0 pg    MCHC 31.7 (L) 32.0 - 36.0 g/dL    RDW 15.6 (H) 11.5 - 14.5 %    Platelets 269 150 - 450 K/uL    MPV 8.7 (L) 9.2 - 12.9 fL    Immature Granulocytes 0.5 0.0 - 0.5 %    Gran # (ANC) 6.3 1.8 - 7.7 K/uL    Immature Grans (Abs) 0.06 (H) 0.00 - 0.04 K/uL    Lymph # 3.5 1.0 - 4.8 K/uL    Mono # 0.7 0.3 - 1.0 K/uL    Eos # 0.5 0.0 - 0.5 K/uL    Baso # 0.08 0.00 - 0.20 K/uL    nRBC 0 0 /100 WBC     Gran % 56.3 38.0 - 73.0 %    Lymph % 31.2 18.0 - 48.0 %    Mono % 6.6 4.0 - 15.0 %    Eosinophil % 4.7 0.0 - 8.0 %    Basophil % 0.7 0.0 - 1.9 %    Differential Method Automated      CMP  Results for orders placed or performed during the hospital encounter of 09/14/22   Comprehensive metabolic panel   Result Value Ref Range    Sodium 139 136 - 145 mmol/L    Potassium 4.1 3.5 - 5.1 mmol/L    Chloride 108 95 - 110 mmol/L    CO2 22 (L) 23 - 29 mmol/L    Glucose 86 70 - 110 mg/dL    BUN 14 6 - 20 mg/dL    Creatinine 0.8 0.5 - 1.4 mg/dL    Calcium 9.2 8.7 - 10.5 mg/dL    Total Protein 7.6 6.0 - 8.4 g/dL    Albumin 3.6 3.5 - 5.2 g/dL    Total Bilirubin 0.3 0.1 - 1.0 mg/dL    Alkaline Phosphatase 111 55 - 135 U/L    AST 46 (H) 10 - 40 U/L    ALT 77 (H) 10 - 44 U/L    Anion Gap 9 8 - 16 mmol/L    eGFR >60 >60 mL/min/1.73 m^2     TSH  Results for orders placed or performed during the hospital encounter of 09/14/22   TSH   Result Value Ref Range    TSH 1.328 0.400 - 4.000 uIU/mL     ETOH  Results for orders placed or performed during the hospital encounter of 09/14/22   Ethanol   Result Value Ref Range    Alcohol, Serum <10 <10 mg/dL     Salicylate  Results for orders placed or performed during the hospital encounter of 09/14/22   Salicylate level   Result Value Ref Range    Salicylate Lvl <5.0 (L) 15.0 - 30.0 mg/dL     Acetaminophen  Results for orders placed or performed during the hospital encounter of 09/14/22   Acetaminophen level   Result Value Ref Range    Acetaminophen (Tylenol), Serum <3.0 (L) 10.0 - 20.0 ug/mL             Assessment/Plan:     Psychosis  Further orders per psych       Elevated LFT- questionable hx of hepatitis, check screen  VTE Risk Mitigation (From admission, onward)      None                Thank you for your consult. I will sign off. Please contact us if you have any additional questions.    Daisy Booth NP  Department of Hospital Medicine   [facility]

## 2022-09-15 NOTE — HPI
28yo female patient presented to ER with psychosis. She was admitted to [facility] and medicine was consulted for H/p. VSS/afebrile. Labs reviewed. Elevated lft

## 2022-09-16 LAB
ALBUMIN SERPL BCP-MCNC: 3.6 G/DL (ref 3.5–5.2)
ALP SERPL-CCNC: 106 U/L (ref 55–135)
ALT SERPL W/O P-5'-P-CCNC: 66 U/L (ref 10–44)
ANION GAP SERPL CALC-SCNC: 12 MMOL/L (ref 8–16)
AST SERPL-CCNC: 37 U/L (ref 10–40)
BILIRUB SERPL-MCNC: 0.3 MG/DL (ref 0.1–1)
BUN SERPL-MCNC: 15 MG/DL (ref 6–20)
CALCIUM SERPL-MCNC: 9.2 MG/DL (ref 8.7–10.5)
CHLORIDE SERPL-SCNC: 105 MMOL/L (ref 95–110)
CO2 SERPL-SCNC: 23 MMOL/L (ref 23–29)
CREAT SERPL-MCNC: 0.9 MG/DL (ref 0.5–1.4)
EST. GFR  (NO RACE VARIABLE): >60 ML/MIN/1.73 M^2
GLUCOSE SERPL-MCNC: 79 MG/DL (ref 70–110)
HAV IGM SERPL QL IA: NORMAL
HBV CORE IGM SERPL QL IA: NORMAL
HBV SURFACE AG SERPL QL IA: NORMAL
HCV AB SERPL QL IA: NORMAL
POTASSIUM SERPL-SCNC: 4.2 MMOL/L (ref 3.5–5.1)
PROT SERPL-MCNC: 7.6 G/DL (ref 6–8.4)
SODIUM SERPL-SCNC: 140 MMOL/L (ref 136–145)

## 2022-09-16 PROCEDURE — 99233 PR SUBSEQUENT HOSPITAL CARE,LEVL III: ICD-10-PCS | Mod: ,,, | Performed by: STUDENT IN AN ORGANIZED HEALTH CARE EDUCATION/TRAINING PROGRAM

## 2022-09-16 PROCEDURE — 80053 COMPREHEN METABOLIC PANEL: CPT | Performed by: NURSE PRACTITIONER

## 2022-09-16 PROCEDURE — 36415 COLL VENOUS BLD VENIPUNCTURE: CPT | Performed by: NURSE PRACTITIONER

## 2022-09-16 PROCEDURE — 11400000 HC PSYCH PRIVATE ROOM

## 2022-09-16 PROCEDURE — 99233 SBSQ HOSP IP/OBS HIGH 50: CPT | Mod: ,,, | Performed by: STUDENT IN AN ORGANIZED HEALTH CARE EDUCATION/TRAINING PROGRAM

## 2022-09-16 PROCEDURE — 25000003 PHARM REV CODE 250: Performed by: STUDENT IN AN ORGANIZED HEALTH CARE EDUCATION/TRAINING PROGRAM

## 2022-09-16 PROCEDURE — 80074 ACUTE HEPATITIS PANEL: CPT | Performed by: NURSE PRACTITIONER

## 2022-09-16 RX ORDER — OLANZAPINE 10 MG/2ML
10 INJECTION, POWDER, FOR SOLUTION INTRAMUSCULAR EVERY 6 HOURS PRN
Status: DISCONTINUED | OUTPATIENT
Start: 2022-09-16 | End: 2022-09-17

## 2022-09-16 RX ORDER — OLANZAPINE 10 MG/1
10 TABLET ORAL EVERY 6 HOURS PRN
Status: DISCONTINUED | OUTPATIENT
Start: 2022-09-16 | End: 2022-09-17

## 2022-09-16 RX ADMIN — DIPHENHYDRAMINE HYDROCHLORIDE 50 MG: 50 CAPSULE ORAL at 06:09

## 2022-09-16 RX ADMIN — HYDROCORTISONE 2.5%: 25 CREAM TOPICAL at 08:09

## 2022-09-16 RX ADMIN — OLANZAPINE 10 MG: 10 TABLET, FILM COATED ORAL at 05:09

## 2022-09-16 RX ADMIN — HALOPERIDOL 5 MG: 5 TABLET ORAL at 06:09

## 2022-09-16 RX ADMIN — FOLIC ACID 1 MG: 1 TABLET ORAL at 08:09

## 2022-09-16 RX ADMIN — THERA TABS 1 TABLET: TAB at 08:09

## 2022-09-16 RX ADMIN — Medication 100 MG: at 08:09

## 2022-09-16 RX ADMIN — QUETIAPINE FUMARATE 450 MG: 200 TABLET ORAL at 08:09

## 2022-09-16 RX ADMIN — LORAZEPAM 2 MG: 1 TABLET ORAL at 07:09

## 2022-09-16 NOTE — PROGRESS NOTES
09/16/22 1045   [facility] Group Therapy   Group Name Therapeutic Recreation   Participation Level None   Patient was interviewing with the doctor, after returned to her room. Patient was given medication earlier this morning, became drowsy and did not attend group.

## 2022-09-16 NOTE — NURSING
Rested intermittently with closed eyes and even resp for 4.75 hours.  Modified VC and all precautions maintained.  Pathways clear and bed in low position.  Intrusive with staff and peers.  Tried to follow staff and male peer.  Difficulty sleeping.  Restless and pacing.  Had to ask pt to not sing loudly in [place] when most of other patients sleeping.

## 2022-09-16 NOTE — NURSING
Haldol 5mg and Benadryl 50mg po given for restlessness, pacing, intrusiveness with peers.  Wanting to disturb peers' sleep to give them things.  Requiring very frequent redirection.

## 2022-09-16 NOTE — PLAN OF CARE
[facility] discussed with pt on collateral consent. Pt signed collateral consent for Jasvir Macias [phone]. [facility] attemtped to contact collateral consent to discuss pt admission.  Jasvir stated:        Reason for admission- describe what happened?    Prior to August 14 she was doing well and she was normal and everything was perfect. So that night she was having contractions and the doctor told her she had Hep C and then Tesha after they said that she was treated her like she was using drugs and she did not. This has stressed her out very much after she had the baby. We then they went home and the test  came back that she did not have hep C. She was looking through the notes from the hospital and it got to her and from then on she has not been sleeping. We called the OB about her not sleeping and they sent her Zoloft and it did not work. We then we went to a psyhccatrist and he  prescried seraqul, but he did not send the prescription in and she was still not sleeping. She talking out of her head and then I took her back to the hospital and they gave her medicine to sleep and it helped, but then they sent her to [place] and we explained to them  that she just needed medications to sleep. The medications they gave made her like a zombie. Her mother came to get her and then took her to [place]. All this happened after she had our child. When she had our other children she did not experience this . This is the first time she has ever done anything like this. I think it was from the bad experience she had having her child and the doctors and nurses being so rude and treating her like she was a drug head that may have triggered it off. I think it was just a bad experience  from the hospital of her having our child    Prior treatment places and dates-doctor name and location  Reason for prior treatment- same or different  How long has patient had problem(s)?  She ahs been having this problem since the 14th of August.      Substance abuse- what , how long, how much, how often?  none  Legal Issues- Current charges, type of offense, probation or parole?  none  History of suicide attempts- when and what methods, did they require medical attention?   So she did have a psychotic episode 3-4 years ago and she did mention suicide, but she did not have a plan. She was just talking about how if she would have  it would have been better, but she was working at that time and work was really stressing her and it was just to much for her so she quit.  Alcohol Use-  What preference of alcohol, how much, how long, how often?  None  History of violence-describe behaviors and triggers  none  Any guns or weapons in the home? If yes, recommend that these be secured.  None. I will make sure that all sharp objects are secured.   What is patients baseline behavior/mood- how well do they know if patient is doing well?  When she tells me a story and if she can not tell me a story I know that things are not right and paying atttention to the kids.   What helps the patient stay well?  Internal/external coping strategies ( attending meetings, going to groups, taking medications, spending time with family ( etc)?  Spending time with family.   Discharge plan:    Where will the patient live upon discharge?  She will come back home  Who else in the home?  Myself, children, and her  Will someone be able to pick the patient up when discharged?   I will come to pick her up upon discharge and if I can not her mother will.
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
## 2022-09-16 NOTE — PSYCH
Pt discussed with [facility] about playing music this morning. PLPC discussed that she will play music when group will take place. Pt also states she would like to get the number to the [facility] because she would like to talk  to her sister who is in group home. Pt has had a very flat affect and very drowsy. Pt fell asleep during psychosocial  assessment. [facility] redirected pt to answer questions during assessment. Pt then kept asking to use phone to call her sister in group home and her family members. [facility] reminded her that she may use the phone at 11:30 am or 4:00 pm when phone time is available. Pt was  fixated on discharge and wanted [facility] to  work on her discharge papers so she could go home. [facility] discussed with pt that only the psychiatrist can discharge her and that [facility] does not have that authority to discharge her and for her to talk to the psychiatrist when will she be discharged.

## 2022-09-16 NOTE — NURSING
"Patient in day room, intrusive toward peers needing staff to redirect her behavior. Patient reports " Y;all are getting on my nerves". Assisted this patient out of the day room and discussed appropriate behavior. Discussed plan of care and educated on medication regiment. Administered Zyprexa 10 MG PO at this time. Will continue to monitor.   "

## 2022-09-16 NOTE — PLAN OF CARE
Pt calm and cooperative, out on unit interacting good with staff and peers, denies SI at this time, appetite good, safety precautions maintained, will continue to monitor

## 2022-09-16 NOTE — PLAN OF CARE
"  Problem: Adult Behavioral Health Plan of Care  Goal: Optimized Coping Skills in Response to Life Stressors  Intervention: Promote Effective Coping Strategies  Flowsheets (Taken 9/16/2022 151)  Supportive Measures:   active listening utilized   counseling provided   positive reinforcement provided   problem-solving facilitated   verbalization of feelings encouraged   guided imagery facilitated   Group Note:     Behavior  Pt attended psychotherapy group. Pt affect labile with dysphoric mood. Pt kept getting up during meditation. PLPC had to redirect pt several times during session. Pt was very talkative at the end of session. Pt  mumbling about the bible and interrupting group.PLPC redirected pt to settle down so everyone may relax. Pt was still non compliant in group.    Intervention  Group psychotherapy today focused on Guided Imagery. The exercise are short phrases that affect the subconscious mind and mold feelings, behaviors, and attitudes. They help to reprogram existing behaviors and thoughts, leading one to greater success and positive action and positive thoughts. PLPC presented the Short Guided Meditation: Release Stress , Anxiety, Beach Meditation, Relaxation.  Patients focused on their strengths and positive coping skills to express their feeling and emotions in their life.    Response      Pt stated " I will tie the bible around me and I will chastise it to me so I can get out of here".     Plan  Patient will be encouraged to continue to attend group psychotherapy.  "

## 2022-09-16 NOTE — PROGRESS NOTES
PSYCHIATRY DAILY INPATIENT PROGRESS NOTE  SUBSEQUENT HOSPITAL VISIT    ENCOUNTER DATE: 9/16/2022  SITE: [facility]    DATE OF ADMISSION: 9/14/2022  3:02 PM  LENGTH OF STAY: 2 days      CHIEF COMPLAINT   Tesha Macias is a 27 y.o. female, seen during daily lucas rounds on the inpatient unit.  Tesha Macias presented with the chief complaint of mood disorder      The patient was seen and examined. The chart was reviewed.     Reviewed notes from Rns, CTRS, and LPC and labs from the last 24 hours.    The patient's case was discussed with the treatment team/care providers today including Rns, CTRS, and LPC    Staff reports severe (requiring PRN medications for non redirectable, agitated behavior in order to prevent harm to self or others) behavioral or management issues.     The patient has been compliant with treatment.      Subjective 09/16/2022       Today the patient continues to exhibit manic behaviors. Required PRN medications. She continues to have poor insight and judgement is clearly impaired. She is difficult to redirect. Instructed patient to try to rest however she was unable to accept this information.    The patient denies any side effects to medications.          Interim/overnight events per report/notes:  Per Rns:   Pt is somewhat restless and elevated but redirectable.  She denies any S/I or H/I at this time.  Pt has little insight into illness.  Pt does not really want to take meds in order to breastfeed.  Pt has to  be redirected and explained that her baby is on formula.  Explained that if the baby is doing well on formula, the pt should take all meds prescribe in order to make herself better.  Pt verbalized understanding.  She is somewhat distracted and repetitive      Rested intermittently with closed eyes and even resp for 4.75 hours    Haldol 5mg and Benadryl 50mg po given for restlessness, pacing, intrusiveness with peers.  Wanting to disturb peers' sleep to give them things.  Requiring  very frequent redirection    L[facility]:   [facility] PLPC discussed that she will play music when group will take place. Pt also states she would like to get the number to the [facility] because she would like to talk  to her sister who is in [place]. Pt has had a very flat affect and very drowsy. Pt fell asleep during psychosocial  assessment. PLPC redirected pt to answer questions during assessment. Pt then kept asking to use phone to call her sister in [place] and her family members. PLPC reminded her that she may use the phone at 11:30 am or 4:00 pm when phone time is available. Pt was  fixated on discharge and wanted [facility] to  work on her discharge papers so she could go home. [facility] discussed with pt that only the psychiatrist can discharge her and that [facility] does not have that authority to discharge her and for her to talk to the psychiatrist when will she be discharged.                Psychiatric ROS (observed, reported, or endorsed/denied):  Depressed mood - No  Interest/pleasure/anhedonia: No  Guilt/hopelessness/worthlessness - No  Changes in Sleep - No  Changes in Appetite - No  Changes in Concentration - No  Changes in Energy - No  PMA/R- No  Suicidal- active/passive ideations - less  Homicidal ideations: active/passive ideations - less    Hallucinations - No  Delusions - No  Disorganized behavior - No  Disorganized speech - No  Negative symptoms - No    Elevated mood - Continuing  Decreased need for sleep - Continuing  Grandiosity - Continuing  Racing thoughts - Continuing  Impulsivity - Continuing  Irritability- Continuing  Increased energy - Continuing  Distractibility - Continuing  Increase in goal-directed activity or PMA- Continuing    Symptoms of GAYE - No  Symptoms of Panic Disorder- No  Symptoms of PTSD - No        Overall progress: Patient is showing no improvement on the Unit to date          Medical ROS  Review of Systems   Constitutional:  Negative for chills  and fever.   HENT:  Negative for hearing loss.    Eyes:  Negative for blurred vision and double vision.   Respiratory:  Negative for shortness of breath.    Cardiovascular:  Negative for chest pain and palpitations.   Gastrointestinal:  Negative for constipation, diarrhea, nausea and vomiting.   Genitourinary:  Negative for dysuria.   Musculoskeletal:  Negative for back pain and neck pain.   Skin:  Negative for rash.   Neurological:  Negative for dizziness and headaches.   Endo/Heme/Allergies:  Negative for environmental allergies.       PAST MEDICAL HISTORY   Past Medical History:   Diagnosis Date    Anxiety     Bipolar disorder     Depression     Hallucination     Headache     History of psychiatric hospitalization      of psychiatric care     Psychiatric problem     Psychosis     PTSD (post-traumatic stress disorder)     Therapy            PSYCHOTROPIC MEDICATIONS   Scheduled Meds:   folic acid  1 mg Oral Daily    hydrocortisone   Rectal BID    multivitamin  1 tablet Oral Daily    QUEtiapine  400 mg Oral QHS    thiamine  100 mg Oral Daily     Continuous Infusions:  PRN Meds:.calcium carbonate, haloperidoL **AND** diphenhydrAMINE **AND** LORazepam **AND** haloperidol lactate **AND** diphenhydrAMINE **AND** lorazepam, hydrOXYzine HCL, magnesium hydroxide 400 mg/5 ml, nicotine        EXAMINATION    VITALS   Vitals:    09/14/22 2003 09/15/22 0722 09/15/22 1947 09/16/22 0744   BP: 120/64 (!) 116/58 97/61 113/62   BP Location: Left arm Left arm  Right arm   Patient Position: Sitting Sitting  Sitting   Pulse: 63 70 73 66   Resp: 16 16 18 20   Temp: 97 °F (36.1 °C) 96.7 °F (35.9 °C) 97.1 °F (36.2 °C) 97.3 °F (36.3 °C)   TempSrc:  Temporal  Temporal   Weight:       Height:           Body mass index is 36.28 kg/m².        CONSTITUTIONAL  General Appearance: unremarkable, age appropriate    MUSCULOSKELETAL  Muscle Strength and Tone:no tremor, no tic  Abnormal Involuntary Movements: No  Gait and Station:  "non-ataxic    PSYCHIATRIC   Level of Consciousness: awake and alert   Orientation: person, place, and situation  Grooming: Casually dressed and Well groomed  Psychomotor Behavior: normal, cooperative  Speech: normal tone, normal rate, normal pitch, normal volume  Language: grossly intact  Mood: "good'  Affect: Inappropriate and Incongruent with mood  Thought Process: tangential  Associations: loose   Thought Content: denies SI, denies HI, and no delusions  Perceptions: denies AH and denies  VH  Memory: Able to recall past events, Remote intact, and Recent intact  Attention:Easily distracted  Fund of Knowledge: Vocabulary appropriate   Estimate if Intelligence:  Average based on work/education history, vocabulary and mental status exam  Insight: poor awareness of illness  Judgment:  poor AEB recent behavior        DIAGNOSTIC TESTING   Laboratory Results  Recent Results (from the past 24 hour(s))   Comprehensive metabolic panel    Collection Time: 09/16/22  6:16 AM   Result Value Ref Range    Sodium 140 136 - 145 mmol/L    Potassium 4.2 3.5 - 5.1 mmol/L    Chloride 105 95 - 110 mmol/L    CO2 23 23 - 29 mmol/L    Glucose 79 70 - 110 mg/dL    BUN 15 6 - 20 mg/dL    Creatinine 0.9 0.5 - 1.4 mg/dL    Calcium 9.2 8.7 - 10.5 mg/dL    Total Protein 7.6 6.0 - 8.4 g/dL    Albumin 3.6 3.5 - 5.2 g/dL    Total Bilirubin 0.3 0.1 - 1.0 mg/dL    Alkaline Phosphatase 106 55 - 135 U/L    AST 37 10 - 40 U/L    ALT 66 (H) 10 - 44 U/L    Anion Gap 12 8 - 16 mmol/L    eGFR >60 >60 mL/min/1.73 m^2       MEDICAL DECISION MAKING          ASSESSMENT         Bipolar disorder, type I, MRE manic  Psychosocial stressors  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  Suicidal ideations  Homicidal ideations           PROBLEM LIST AND MANAGEMENT PLANS        Bipolar disorder, type I, MRE manic  - stop zoloft 2/2 gopi  - increased home seroquel from 300 to 400 mg PO qhs -increase to 450 mg PO qhs tonight (titrating slowing given low BP)  - pt " counseled  - follow up with outpatient mental health provider after discharge     Psychosocial stressors  - pt counseled  - SW consulted for assistance with additional resources      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 The patient expresses understanding of the above and displays the capacity to agree with this treatment given said understanding. Patient also agrees that, currently, the benefits outweigh the risks and would like to pursue/continue treatment at this time.    Any medications being used off-label were discussed with the patient inclusive of the evidence base for the use of the medications and consent was obtained for the off-label use of the medication.       DISCHARGE PLANNING  Expected Disposition Plan: Home or Self Care      NEED FOR CONTINUED HOSPITALIZATION  Psychiatric illness continues to pose a potential threat to life or bodily function, of self or others, thereby requiring the need for continued inpatient psychiatric hospitalization: Yes, due to: gravely disabled, as evidenced by:  Ongoing concerns with grave disability with patient unable to perform basic feeding, hygiene and dressing activities without significant constant support.    Protective inpatient pyschiatric hospitalization required while a safe disposition plan is enacted: Yes    Patient stabilized and ready for discharge from inpatient psychiatric unit:  No        STAFF:   Toni Olson III, MD  Psychiatry

## 2022-09-16 NOTE — PLAN OF CARE
"Patient elevated, coming to the nurses for multiple requests. " Can you look up the [facility] correction. My sister is in there. Look up her correction number". Patient needing redirecting by staff for intrusiveness toward staff and peers. Patient denies SI/HI no self harming behavior displayed, no aggressive behavior displayed towards others. Patient contracted safety with staff and unit.  Discussed healthy coping skills and techniques.  Educated, reviewed  and discussed plan of care and medication regiment with this patient. Patient voiced understanding of all teachings.    "

## 2022-09-17 PROCEDURE — 11400000 HC PSYCH PRIVATE ROOM

## 2022-09-17 PROCEDURE — 99232 SBSQ HOSP IP/OBS MODERATE 35: CPT | Mod: ,,, | Performed by: PSYCHIATRY & NEUROLOGY

## 2022-09-17 PROCEDURE — 25000003 PHARM REV CODE 250: Performed by: PSYCHIATRY & NEUROLOGY

## 2022-09-17 PROCEDURE — 90833 PR PSYCHOTHERAPY W/PATIENT W/E&M, 30 MIN (ADD ON): ICD-10-PCS | Mod: ,,, | Performed by: PSYCHIATRY & NEUROLOGY

## 2022-09-17 PROCEDURE — 99232 PR SUBSEQUENT HOSPITAL CARE,LEVL II: ICD-10-PCS | Mod: ,,, | Performed by: PSYCHIATRY & NEUROLOGY

## 2022-09-17 PROCEDURE — 90833 PSYTX W PT W E/M 30 MIN: CPT | Mod: ,,, | Performed by: PSYCHIATRY & NEUROLOGY

## 2022-09-17 PROCEDURE — 63600175 PHARM REV CODE 636 W HCPCS: Performed by: PSYCHIATRY & NEUROLOGY

## 2022-09-17 PROCEDURE — 25000003 PHARM REV CODE 250: Performed by: STUDENT IN AN ORGANIZED HEALTH CARE EDUCATION/TRAINING PROGRAM

## 2022-09-17 RX ORDER — CHLORPROMAZINE HYDROCHLORIDE 25 MG/ML
100 INJECTION INTRAMUSCULAR EVERY 4 HOURS PRN
Status: DISCONTINUED | OUTPATIENT
Start: 2022-09-17 | End: 2022-09-21 | Stop reason: HOSPADM

## 2022-09-17 RX ORDER — LITHIUM CARBONATE 300 MG/1
300 TABLET, FILM COATED, EXTENDED RELEASE ORAL EVERY 12 HOURS
Status: DISCONTINUED | OUTPATIENT
Start: 2022-09-17 | End: 2022-09-20

## 2022-09-17 RX ORDER — CHLORPROMAZINE HYDROCHLORIDE 100 MG/1
100 TABLET, FILM COATED ORAL EVERY 4 HOURS PRN
Status: DISCONTINUED | OUTPATIENT
Start: 2022-09-17 | End: 2022-09-21 | Stop reason: HOSPADM

## 2022-09-17 RX ORDER — QUETIAPINE FUMARATE 25 MG/1
50 TABLET, FILM COATED ORAL DAILY
Status: DISCONTINUED | OUTPATIENT
Start: 2022-09-17 | End: 2022-09-18

## 2022-09-17 RX ORDER — CLONAZEPAM 1 MG/1
1 TABLET ORAL 2 TIMES DAILY
Status: DISCONTINUED | OUTPATIENT
Start: 2022-09-17 | End: 2022-09-18

## 2022-09-17 RX ADMIN — CHLORPROMAZINE HYDROCHLORIDE 100 MG: 100 TABLET, COATED ORAL at 12:09

## 2022-09-17 RX ADMIN — QUETIAPINE FUMARATE 500 MG: 300 TABLET ORAL at 08:09

## 2022-09-17 RX ADMIN — HYDROCORTISONE 2.5% 2.5 %: 25 CREAM TOPICAL at 09:09

## 2022-09-17 RX ADMIN — CHLORPROMAZINE HYDROCHLORIDE 100 MG: 100 TABLET, COATED ORAL at 05:09

## 2022-09-17 RX ADMIN — HYDROXYZINE HYDROCHLORIDE 50 MG: 25 TABLET ORAL at 08:09

## 2022-09-17 RX ADMIN — THERA TABS 1 TABLET: TAB at 08:09

## 2022-09-17 RX ADMIN — LITHIUM CARBONATE 300 MG: 300 TABLET, FILM COATED, EXTENDED RELEASE ORAL at 08:09

## 2022-09-17 RX ADMIN — HYDROCORTISONE 2.5%: 25 CREAM TOPICAL at 08:09

## 2022-09-17 RX ADMIN — CLONAZEPAM 1 MG: 1 TABLET ORAL at 08:09

## 2022-09-17 RX ADMIN — FOLIC ACID 1 MG: 1 TABLET ORAL at 08:09

## 2022-09-17 RX ADMIN — CHLORPROMAZINE HYDROCHLORIDE 100 MG: 100 TABLET, COATED ORAL at 10:09

## 2022-09-17 RX ADMIN — OLANZAPINE 10 MG: 10 TABLET, FILM COATED ORAL at 07:09

## 2022-09-17 RX ADMIN — Medication 100 MG: at 08:09

## 2022-09-17 RX ADMIN — CLONAZEPAM 1 MG: 1 TABLET ORAL at 10:09

## 2022-09-17 RX ADMIN — LITHIUM CARBONATE 300 MG: 300 TABLET, FILM COATED, EXTENDED RELEASE ORAL at 10:09

## 2022-09-17 RX ADMIN — QUETIAPINE FUMARATE 50 MG: 25 TABLET ORAL at 10:09

## 2022-09-17 NOTE — NURSING
"Patient in the hallway yelling " I cannot take this no more. I need drugs. I am Bipolar and I am loose it. Spoke to this patient about her medication and care plan. Patient reports "I feel like I am loose control. I cannot take it no more. I need a shot to put me to sleep. Call the doctor now". Notified Dr. Gambino of this patient status and new orders were written and verified. At 1205, Discussed plan of care and medication regiment with this patient. She voiced understanding of teaching. Administered Thorazine 100 MG PO . Patient tolerated well. Will continue to monitor.   "

## 2022-09-17 NOTE — PROGRESS NOTES
PSYCHIATRY DAILY INPATIENT PROGRESS NOTE  SUBSEQUENT HOSPITAL VISIT    ENCOUNTER DATE: 9/17/2022  SITE: [facility]    DATE OF ADMISSION: 9/14/2022  3:02 PM  LENGTH OF STAY: 3 days      CHIEF COMPLAINT   Tesha Macias is a 27 y.o. female, seen during daily lucas rounds on the inpatient unit.  Tesha Macias presented with the chief complaint of mood disorder      The patient was seen and examined. The chart was reviewed.     Reviewed notes from Rns, MD, CTRS, and LPC and labs from the last 24 hours.    The patient's case was discussed with the treatment team/care providers today including Rns    Staff reports severe (requiring PRN medications for non redirectable, agitated behavior in order to prevent harm to self or others) behavioral or management issues.     The patient has been compliant with treatment.      Subjective 09/17/2022       Today the patient continues to exhibit manic behaviors. She again Required PRN medications. She continues to have poor insight and judgement is clearly impaired. She is difficult to redirect. Instructed patient to try to rest however she was unable to accept this information.    She relayed numerous narratives centered around the need to be discharged. She reports that she previously took depakote with benefit, however, liver enzymes were elevated    She reports that she is ok with not breast feeding. She would like to try lithium.     The patient denies any side effects to medications.        Psychiatric ROS (observed, reported, or endorsed/denied):  Depressed mood - No  Interest/pleasure/anhedonia: No  Guilt/hopelessness/worthlessness - No  Changes in Sleep - No  Changes in Appetite - No  Changes in Concentration - No  Changes in Energy - No  PMA/R- No  Suicidal- active/passive ideations - less  Homicidal ideations: active/passive ideations - less    Hallucinations - No  Delusions - No  Disorganized behavior - No  Disorganized speech - No  Negative symptoms -  No    Elevated mood - Continuing  Decreased need for sleep - Continuing  Grandiosity - Continuing  Racing thoughts - Continuing  Impulsivity - Continuing  Irritability- Continuing  Increased energy - Continuing  Distractibility - Continuing  Increase in goal-directed activity or PMA- Continuing    Symptoms of GAYE - No  Symptoms of Panic Disorder- No  Symptoms of PTSD - No        Overall progress: Patient is showing no improvement on the Unit to date      PSYCHOTHERAPY ADD-ON [id number]   16-37 minutes      Time: 16 minutes  Participants: Met with patient    Therapeutic Intervention Type: insight oriented psychotherapy, behavior modifying psychotherapy, supportive psychotherapy, interactive psychotherapy  Why chosen therapy is appropriate versus another modality: relevant to diagnosis, patient responds to this modality, evidence based practice    Target symptoms: mood disorder  Primary focus: gopi  Psychotherapeutic techniques: supportive and psycho-educational techniques    Outcome monitoring methods: self-report, observation    Patient's response to intervention:  The patient's response to intervention is accepting.    Progress toward goals:  The patient's progress toward goals is limited.        Medical ROS  Review of Systems   Constitutional:  Negative for chills and fever.   HENT:  Negative for hearing loss.    Eyes:  Negative for blurred vision and double vision.   Respiratory:  Negative for shortness of breath.    Cardiovascular:  Negative for chest pain and palpitations.   Gastrointestinal:  Negative for constipation, diarrhea, nausea and vomiting.   Genitourinary:  Negative for dysuria.   Musculoskeletal:  Negative for back pain and neck pain.   Skin:  Negative for rash.   Neurological:  Negative for dizziness and headaches.   Endo/Heme/Allergies:  Negative for environmental allergies.       PAST MEDICAL HISTORY   Past Medical History:   Diagnosis Date    Anxiety     Bipolar disorder     Depression      "Hallucination     Headache     History of psychiatric hospitalization     Hx of psychiatric care     Psychiatric problem     Psychosis     PTSD (post-traumatic stress disorder)     Therapy            PSYCHOTROPIC MEDICATIONS   Scheduled Meds:   folic acid  1 mg Oral Daily    hydrocortisone   Rectal BID    multivitamin  1 tablet Oral Daily    QUEtiapine  450 mg Oral QHS    thiamine  100 mg Oral Daily     Continuous Infusions:  PRN Meds:.calcium carbonate, hydrOXYzine HCL, magnesium hydroxide 400 mg/5 ml, nicotine, OLANZapine, OLANZapine        EXAMINATION    VITALS   Vitals:    09/15/22 1947 09/16/22 0744 09/16/22 2026 09/17/22 0800   BP: 97/61 113/62 120/62 117/63   BP Location:  Right arm Right arm Right arm   Patient Position:  Sitting Sitting Sitting   Pulse: 73 66 70 72   Resp: 18 20 18 18   Temp: 97.1 °F (36.2 °C) 97.3 °F (36.3 °C) 96.8 °F (36 °C) 97.9 °F (36.6 °C)   TempSrc:  Temporal Temporal Temporal   Weight:       Height:           Body mass index is 36.28 kg/m².        CONSTITUTIONAL  General Appearance: unremarkable, age appropriate    MUSCULOSKELETAL  Muscle Strength and Tone:no tremor, no tic  Abnormal Involuntary Movements: No  Gait and Station: non-ataxic    PSYCHIATRIC   Level of Consciousness: awake and alert   Orientation: person, place, and situation  Grooming: Casually dressed and Well groomed  Psychomotor Behavior: normal, cooperative  Speech: normal tone, normal rate, normal pitch, normal volume  Language: grossly intact  Mood: "good'  Affect: Inappropriate and Incongruent with mood  Thought Process: tangential  Associations: loose   Thought Content: denies SI, denies HI, and no delusions  Perceptions: denies AH and denies  VH  Memory: Able to recall past events, Remote intact, and Recent intact  Attention:Easily distracted  Fund of Knowledge: Vocabulary appropriate   Estimate if Intelligence:  Average based on work/education history, vocabulary and mental status exam  Insight: poor awareness " of illness  Judgment:  poor AEB recent behavior        DIAGNOSTIC TESTING   Laboratory Results  No results found for this or any previous visit (from the past 24 hour(s)).      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  Suicidal ideations  Homicidal ideations           PROBLEM LIST AND MANAGEMENT PLANS        Bipolar disorder, type I, MRE manic  - stop zoloft 2/2 gopi  - increased home seroquel from 300 to 400 mg PO qhs -increase to 450 mg PO qhs tonight (titrating slowing given low BP)- increase to 50 mg po q day and 500 mg po q HS  -start adjunctive Lithiu CT at 300 mg po BID  -start adjunctive klonopin 1 mg po BID- will taper off as meds reach therapeutic levels  - pt counseled  - follow up with outpatient mental health provider after discharge     Psychosocial stressors  - pt counseled  - SW consulted for assistance with additional resources      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  - start hydroxyzine 50 mg PO qhs PRN  - start melatonin 9 mg PO qhs PRN     Anxiety:pt counseled  -klonopin as above  -vistaril prn     Suicidal ideations  - continue psychiatric hospitalization  - provide psychotherapeutic interventions and medication management  - monitor         Homicidal ideations  - continue psychiatric hospitalization  - provide psychotherapeutic interventions and medication management  - monitor           Discussed diagnosis, risks and benefits of proposed treatment vs alternative treatments vs no treatment, potential side effects of these treatments and the inherent unpredictability of treatment. The patient expresses understanding of the above and displays the capacity to agree with this treatment given said understanding. Patient also agrees that, currently, the benefits outweigh the risks and would like to pursue/continue treatment  at this time.    Any medications being used off-label were discussed with the patient inclusive of the evidence base for the use of the medications and consent was obtained for the off-label use of the medication.       DISCHARGE PLANNING  Expected Disposition Plan: Home or Self Care      NEED FOR CONTINUED HOSPITALIZATION  Psychiatric illness continues to pose a potential threat to life or bodily function, of self or others, thereby requiring the need for continued inpatient psychiatric hospitalization: Yes, due to: gravely disabled, as evidenced by:  Ongoing concerns with grave disability with patient unable to perform basic feeding, hygiene and dressing activities without significant constant support.    Protective inpatient pyschiatric hospitalization required while a safe disposition plan is enacted: Yes    Patient stabilized and ready for discharge from inpatient psychiatric unit: No        STAFF:   Kit Gambino MD  Psychiatry

## 2022-09-17 NOTE — NURSING
"Patient at the nurses station demanding to call to see about her daughter. States she has a feeling that her daughter is in danger and might be with a child molester. Patient redirected. Refuses to leave the window, while report is being given. States "I'll start screaming" Patient advised that she will get shot if she does, and it is up to her. After a short while patient leaves the station and stands in the garcia by her door, and decides to go into her room. Will continue to monitor. All safety checks remain in place.  "

## 2022-09-17 NOTE — PLAN OF CARE
Following POC.  Makes needs known.  Denies SI/HI, AVH.  Denies pain.  Out on the unit most of the evening.  On the phone frequently.  Complains about other patients, mostly Tia.  States she is anxious, declined PRN.  Reminded to use her coping skills,voiced understanding.  Medication compliance.  Appetite is good.  Encouraged usage of coping skills.  Encouraged self reflection.  Free o fall.

## 2022-09-17 NOTE — NURSING
Pt sleeping at this time, slept 6.5 hrs with 1 awakenings. NAD. Resp even and unlabored.Pathways clear,bed in low position. Q 15 min safety check ongoing.All precautions maintained.

## 2022-09-17 NOTE — NURSING
"Patient walking the hallway laughing to self. Patient reports "until the next pill". Will continue to monoitor.   "

## 2022-09-17 NOTE — PLAN OF CARE
"Patient pressured speech. Patient irritable and confrontational. Patient somatic. Patient yelling in the hallway several time "I hyperventilate" then she starts laughing loudly. Patient needing staff to redirect this behavior. Patient intrusive toward peers and staff. Patient illogical and delusional with poor insight and judgment  into illness. Patient denies SI/HI no self harming behavior displayed, no aggressive behavior displayed towards others. Patient contracted safety with staff and unit.  Discussed healthy coping skills and techniques.  Educated, reviewed  and discussed plan of care and medication regiment with this patient. Patient voiced understanding of all teachings.    "

## 2022-09-17 NOTE — NURSING
"Patient arguing with staff not wanting to wear her mask. Discussed hospital policy of mask. Patient requesting "Just give me some medication because my Bipolar is out of control". Administered Xyprexa 10 MG PO. Patient tolerated well.   "

## 2022-09-17 NOTE — NURSING
"Patient yelling in the hallway " I need more drugs here. I need something in my mouth like another pill". Spoke to patient concerning her response. Patient reports " Just give me that pill so I can feel better". Administered Thorazine 100 MG PO patient tolerated well.   "

## 2022-09-18 PROCEDURE — 63600175 PHARM REV CODE 636 W HCPCS: Performed by: PSYCHIATRY & NEUROLOGY

## 2022-09-18 PROCEDURE — 90833 PSYTX W PT W E/M 30 MIN: CPT | Mod: ,,, | Performed by: PSYCHIATRY & NEUROLOGY

## 2022-09-18 PROCEDURE — 99232 SBSQ HOSP IP/OBS MODERATE 35: CPT | Mod: ,,, | Performed by: PSYCHIATRY & NEUROLOGY

## 2022-09-18 PROCEDURE — 25000003 PHARM REV CODE 250: Performed by: STUDENT IN AN ORGANIZED HEALTH CARE EDUCATION/TRAINING PROGRAM

## 2022-09-18 PROCEDURE — 99232 PR SUBSEQUENT HOSPITAL CARE,LEVL II: ICD-10-PCS | Mod: ,,, | Performed by: PSYCHIATRY & NEUROLOGY

## 2022-09-18 PROCEDURE — 25000003 PHARM REV CODE 250: Performed by: PSYCHIATRY & NEUROLOGY

## 2022-09-18 PROCEDURE — 90833 PR PSYCHOTHERAPY W/PATIENT W/E&M, 30 MIN (ADD ON): ICD-10-PCS | Mod: ,,, | Performed by: PSYCHIATRY & NEUROLOGY

## 2022-09-18 PROCEDURE — 11400000 HC PSYCH PRIVATE ROOM

## 2022-09-18 RX ORDER — QUETIAPINE FUMARATE 100 MG/1
100 TABLET, FILM COATED ORAL DAILY
Status: DISCONTINUED | OUTPATIENT
Start: 2022-09-19 | End: 2022-09-21 | Stop reason: HOSPADM

## 2022-09-18 RX ORDER — CLONAZEPAM 1 MG/1
1 TABLET ORAL 3 TIMES DAILY
Status: DISCONTINUED | OUTPATIENT
Start: 2022-09-18 | End: 2022-09-21

## 2022-09-18 RX ORDER — QUETIAPINE FUMARATE 300 MG/1
600 TABLET, FILM COATED ORAL NIGHTLY
Status: DISCONTINUED | OUTPATIENT
Start: 2022-09-18 | End: 2022-09-21 | Stop reason: HOSPADM

## 2022-09-18 RX ADMIN — QUETIAPINE FUMARATE 600 MG: 300 TABLET ORAL at 08:09

## 2022-09-18 RX ADMIN — CHLORPROMAZINE HYDROCHLORIDE 100 MG: 25 INJECTION INTRAMUSCULAR at 03:09

## 2022-09-18 RX ADMIN — QUETIAPINE FUMARATE 50 MG: 25 TABLET ORAL at 08:09

## 2022-09-18 RX ADMIN — THERA TABS 1 TABLET: TAB at 08:09

## 2022-09-18 RX ADMIN — HYDROCORTISONE 2.5% 2.5 %: 25 CREAM TOPICAL at 08:09

## 2022-09-18 RX ADMIN — FOLIC ACID 1 MG: 1 TABLET ORAL at 08:09

## 2022-09-18 RX ADMIN — CLONAZEPAM 1 MG: 1 TABLET ORAL at 08:09

## 2022-09-18 RX ADMIN — CLONAZEPAM 1 MG: 1 TABLET ORAL at 02:09

## 2022-09-18 RX ADMIN — CHLORPROMAZINE HYDROCHLORIDE 100 MG: 25 INJECTION INTRAMUSCULAR at 11:09

## 2022-09-18 RX ADMIN — LITHIUM CARBONATE 300 MG: 300 TABLET, FILM COATED, EXTENDED RELEASE ORAL at 08:09

## 2022-09-18 RX ADMIN — Medication 100 MG: at 08:09

## 2022-09-18 RX ADMIN — CHLORPROMAZINE HYDROCHLORIDE 100 MG: 100 TABLET, COATED ORAL at 08:09

## 2022-09-18 RX ADMIN — CHLORPROMAZINE HYDROCHLORIDE 100 MG: 100 TABLET, COATED ORAL at 07:09

## 2022-09-18 NOTE — NURSING
Patient talking on the phone. Patient laughing, appears her mood has improved wit brighter affect.

## 2022-09-18 NOTE — NURSING
"Patient at the counter reports "I need medicine to calm me down".Discussed medication regiment . Administered Thorazine 100 MG PO. Patient tolerated.   "

## 2022-09-18 NOTE — PROGRESS NOTES
PSYCHIATRY DAILY INPATIENT PROGRESS NOTE  SUBSEQUENT HOSPITAL VISIT    ENCOUNTER DATE: 9/18/2022  [facility]    DATE OF ADMISSION: 9/14/2022  3:02 PM  LENGTH OF STAY: 4 days      CHIEF COMPLAINT   Tesha Macias is a 27 y.o. female, seen during daily lucas rounds on the inpatient unit.  Tesha Macias presented with the chief complaint of mood disorder      The patient was seen and examined. The chart was reviewed.     Reviewed notes from Rns and labs from the last 24 hours.    The patient's case was discussed with the treatment team/care providers today including Rns    Staff reports severe (requiring PRN medications for non redirectable, agitated behavior in order to prevent harm to self or others) behavioral or management issues.     The patient has been compliant with treatment.      Subjective 09/18/2022       Today the patient continues to exhibit manic behaviors. She again Required several PRN medications for agitation.   -She continues to have poor insight and judgement is clearly impaired. She remains difficult to redirect.     She relayed numerous narratives centered around the need to be discharged. She reports that she previously took depakote with benefit, however, liver enzymes were elevated.    She reports that she is ok with not breast feeding. She would like to try lithium/medications as prescribed    She was focused on discharge. .     The patient denies any side effects to medications.        Psychiatric ROS (observed, reported, or endorsed/denied):  Depressed mood - No  Interest/pleasure/anhedonia: No  Guilt/hopelessness/worthlessness - No  Changes in Sleep - No  Changes in Appetite - No  Changes in Concentration - No  Changes in Energy - No  PMA/R- No  Suicidal- active/passive ideations - less  Homicidal ideations: active/passive ideations - less    Hallucinations - No  Delusions - No  Disorganized behavior - No  Disorganized speech - No  Negative symptoms - No    Elevated mood -  Continuing  Decreased need for sleep - Continuing  Grandiosity - Continuing  Racing thoughts - Continuing  Impulsivity - Continuing  Irritability- Continuing  Increased energy - Continuing  Distractibility - Continuing  Increase in goal-directed activity or PMA- Continuing    Symptoms of GAYE - No  Symptoms of Panic Disorder- No  Symptoms of PTSD - No        Overall progress: Patient is showing no improvement on the Unit to date      PSYCHOTHERAPY ADD-ON +[id number]   16-37 minutes      Time: 16 minutes  Participants: Met with patient    Therapeutic Intervention Type: insight oriented psychotherapy, behavior modifying psychotherapy, supportive psychotherapy, interactive psychotherapy  Why chosen therapy is appropriate versus another modality: relevant to diagnosis, patient responds to this modality, evidence based practice    Target symptoms: mood disorder  Primary focus: gopi  Psychotherapeutic techniques: supportive and psycho-educational techniques; motivational techniques for tx adherence     Outcome monitoring methods: self-report, observation    Patient's response to intervention:  The patient's response to intervention is accepting.    Progress toward goals:  The patient's progress toward goals is limited.        Medical ROS  Review of Systems   Constitutional:  Negative for chills and fever.   HENT:  Negative for hearing loss.    Eyes:  Negative for blurred vision and double vision.   Respiratory:  Negative for shortness of breath.    Cardiovascular:  Negative for chest pain and palpitations.   Gastrointestinal:  Negative for constipation, diarrhea, nausea and vomiting.   Genitourinary:  Negative for dysuria.   Musculoskeletal:  Negative for back pain and neck pain.   Skin:  Negative for rash.   Neurological:  Negative for dizziness and headaches.   Endo/Heme/Allergies:  Negative for environmental allergies.       PAST MEDICAL HISTORY   Past Medical History:   Diagnosis Date    Anxiety     Bipolar disorder      "Depression     Hallucination     Headache     History of psychiatric hospitalization     Hx of psychiatric care     Psychiatric problem     Psychosis     PTSD (post-traumatic stress disorder)     Therapy            PSYCHOTROPIC MEDICATIONS   Scheduled Meds:   clonazePAM  1 mg Oral BID    folic acid  1 mg Oral Daily    hydrocortisone   Rectal BID    lithium  300 mg Oral Q12H    multivitamin  1 tablet Oral Daily    QUEtiapine  50 mg Oral Daily    QUEtiapine  500 mg Oral QHS    thiamine  100 mg Oral Daily     Continuous Infusions:  PRN Meds:.calcium carbonate, chlorproMAZINE, chlorproMAZINE, hydrOXYzine HCL, magnesium hydroxide 400 mg/5 ml, nicotine        EXAMINATION    VITALS   Vitals:    09/16/22 2026 09/17/22 0800 09/17/22 1941 09/18/22 0800   BP: 120/62 117/63 112/65 132/66   BP Location: Right arm Right arm Left arm Right arm   Patient Position: Sitting Sitting Sitting Sitting   Pulse: 70 72 83 104   Resp: 18 18 18 18   Temp: 96.8 °F (36 °C) 97.9 °F (36.6 °C) 97.6 °F (36.4 °C) 97.6 °F (36.4 °C)   TempSrc: Temporal Temporal Temporal Temporal   Weight:    101.2 kg (223 lb 1.7 oz)   Height:           Body mass index is 36.01 kg/m².        CONSTITUTIONAL  General Appearance: unremarkable, age appropriate    MUSCULOSKELETAL  Muscle Strength and Tone:no tremor, no tic  Abnormal Involuntary Movements: No  Gait and Station: non-ataxic    PSYCHIATRIC   Level of Consciousness: awake and alert   Orientation: person, place, and situation  Grooming: Casually dressed and Well groomed  Psychomotor Behavior: normal, cooperative  Speech: normal tone, normal rate, normal pitch, normal volume  Language: grossly intact  Mood: "fine'  Affect: Inappropriate and Incongruent with mood  Thought Process: tangential  Associations: loose   Thought Content: denies SI, denies HI, and no delusions  Perceptions: denies AH and denies  VH  Memory: Able to recall past events, Remote intact, and Recent intact  Attention:Easily distracted  Fund of " Knowledge: Vocabulary appropriate   Estimate if Intelligence:  Average based on work/education history, vocabulary and mental status exam  Insight: poor awareness of illness  Judgment:  poor AEB recent behavior        DIAGNOSTIC TESTING   Laboratory Results  No results found for this or any previous visit (from the past 24 hour(s)).      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  - started/continue at hydroxyzine 50 mg PO qhs PRN  - started melatonin 9 mg PO qhs PRN     Anxiety:pt counseled  -klonopin as above  -vistaril prn     Suicidal ideations  - continue psychiatric hospitalization  - provide psychotherapeutic interventions and medication management  - monitor         Homicidal ideations  - continue psychiatric hospitalization  - provide psychotherapeutic interventions and medication management  - monitor           Discussed diagnosis, risks and benefits of proposed treatment vs alternative treatments vs no treatment, potential side effects of these  treatments and the inherent unpredictability of treatment. The patient expresses understanding of the above and displays the capacity to agree with this treatment given said understanding. Patient also agrees that, currently, the benefits outweigh the risks and would like to pursue/continue treatment at this time.    Any medications being used off-label were discussed with the patient inclusive of the evidence base for the use of the medications and consent was obtained for the off-label use of the medication.       DISCHARGE PLANNING  Expected Disposition Plan: Home or Self Care      NEED FOR CONTINUED HOSPITALIZATION  Psychiatric illness continues to pose a potential threat to life or bodily function, of self or others, thereby requiring the need for continued inpatient psychiatric hospitalization: Yes, due to: gravely disabled, as evidenced by:  Ongoing concerns with grave disability with patient unable to perform basic feeding, hygiene and dressing activities without significant constant support.    Protective inpatient pyschiatric hospitalization required while a safe disposition plan is enacted: Yes    Patient stabilized and ready for discharge from inpatient psychiatric unit: No        STAFF:   Kit Gambino MD  Psychiatry

## 2022-09-18 NOTE — NURSING
"Patient yelling and singing loudly in the hallway. Discussed appropriate behavior to display on the unit. Discussed coping skills. Patient reports "call security". Security called to the unit. Patient requesting "I want the shot of medicine". Administered Thorazine 100 MG IM patient tolerated well. Will continue to monitor.   "

## 2022-09-18 NOTE — PLAN OF CARE
"Patient blunted affect with pressured speech. Patient illogical and delusional thoughts with flight of ideas. Patient impulsive and intrusive towards peers and staff. Needing staff to redirect her behavior. Patient seeks out staff for numerous things. "I want a unicorn. I need to call my  now. I am ready to go home. Can I get the number for the chief of the [employer]?". Emotional support provided for this patient and educated her on her plan of care. Patient denies SI/HI no self harming behavior displayed, no aggressive behavior displayed towards others. Patient contracted safety with staff and unit.  Discussed healthy coping skills and techniques.  Educated, reviewed  and discussed plan of care and medication regiment with this patient. Patient voiced understanding of all teachings.    "

## 2022-09-18 NOTE — NURSING
Pt sleeping at this time, slept 7 hrs with 1 awakenings. NAD. Resp even and unlabored.Pathways clear,bed in low position. Q 15 min safety check ongoing.All precautions maintained.

## 2022-09-18 NOTE — NURSING
"Patient in the hallway yelling loudly "I need a shot now. Come give it to me". Patient continuously yelling she wanted a shot. Security called to the unit. Spoke to this patient concerning her feeling patient reported "Just give me a shot. I need it". Administered Thorazine 100 MG IM. Patient tolerated well. Will continue to monitor.   "

## 2022-09-18 NOTE — PLAN OF CARE
Following POC.  Makes needs known.  Denies SI/HI AVH.  Denies pain.  States she had an ok day, feeling ok.  Less animated tonight.  Out on the unit most of the evening.  Talked on the phone.  Minimal interactions with peers.  Follows staff around as they make rounds.  Appetite is good.  Medication complaint.  Encouraged group attendance.  Free of fall.

## 2022-09-19 PROCEDURE — 25000003 PHARM REV CODE 250: Performed by: PSYCHIATRY & NEUROLOGY

## 2022-09-19 PROCEDURE — 90833 PR PSYCHOTHERAPY W/PATIENT W/E&M, 30 MIN (ADD ON): ICD-10-PCS | Mod: ,,, | Performed by: PSYCHIATRY & NEUROLOGY

## 2022-09-19 PROCEDURE — 99233 PR SUBSEQUENT HOSPITAL CARE,LEVL III: ICD-10-PCS | Mod: ,,, | Performed by: PSYCHIATRY & NEUROLOGY

## 2022-09-19 PROCEDURE — 99233 SBSQ HOSP IP/OBS HIGH 50: CPT | Mod: ,,, | Performed by: PSYCHIATRY & NEUROLOGY

## 2022-09-19 PROCEDURE — 25000003 PHARM REV CODE 250: Performed by: STUDENT IN AN ORGANIZED HEALTH CARE EDUCATION/TRAINING PROGRAM

## 2022-09-19 PROCEDURE — 90833 PSYTX W PT W E/M 30 MIN: CPT | Mod: ,,, | Performed by: PSYCHIATRY & NEUROLOGY

## 2022-09-19 PROCEDURE — 63600175 PHARM REV CODE 636 W HCPCS: Performed by: PSYCHIATRY & NEUROLOGY

## 2022-09-19 PROCEDURE — 11400000 HC PSYCH PRIVATE ROOM

## 2022-09-19 RX ADMIN — LITHIUM CARBONATE 300 MG: 300 TABLET, FILM COATED, EXTENDED RELEASE ORAL at 08:09

## 2022-09-19 RX ADMIN — Medication 100 MG: at 08:09

## 2022-09-19 RX ADMIN — CLONAZEPAM 1 MG: 1 TABLET ORAL at 08:09

## 2022-09-19 RX ADMIN — THERA TABS 1 TABLET: TAB at 08:09

## 2022-09-19 RX ADMIN — CHLORPROMAZINE HYDROCHLORIDE 100 MG: 100 TABLET, COATED ORAL at 01:09

## 2022-09-19 RX ADMIN — QUETIAPINE FUMARATE 600 MG: 300 TABLET ORAL at 08:09

## 2022-09-19 RX ADMIN — QUETIAPINE FUMARATE 100 MG: 100 TABLET ORAL at 08:09

## 2022-09-19 RX ADMIN — FOLIC ACID 1 MG: 1 TABLET ORAL at 08:09

## 2022-09-19 RX ADMIN — CLONAZEPAM 1 MG: 1 TABLET ORAL at 02:09

## 2022-09-19 NOTE — PROGRESS NOTES
"PSYCHIATRY DAILY INPATIENT PROGRESS NOTE  SUBSEQUENT HOSPITAL VISIT    ENCOUNTER DATE: 9/19/2022  [facility]    DATE OF ADMISSION: 9/14/2022  3:02 PM  LENGTH OF STAY: 5 days      CHIEF COMPLAINT   Tesha Macias is a 27 y.o. female, seen during daily lucas rounds on the inpatient unit.  Tesha Macias presented with the chief complaint of mood disorder      The patient was seen and examined. The chart was reviewed.     Reviewed notes from Rns and labs from the last 24 hours.    The patient's case was discussed with the treatment team/care providers today including Rns, CTRS, and LPC    Staff reports severe (requiring PRN medications for non redirectable, agitated behavior in order to prevent harm to self or others) behavioral or management issues.       The patient has been compliant with treatment.      Subjective 09/19/2022       Today the patient continues to exhibit manic behaviors. She again required several PRN medications for agitation. Her thought process remains derailed.  -She continues to have poor insight and judgement remains impaired. "I'm a normal human.. I don't have Bipolar.. I have psychotic symptoms." She remains difficult to redirect. Fx/bx impairment remains severe    She relayed numerous narratives centered around the need to be discharged and struggling with being  from her children.      She reports that she is ok with not breast feeding. She would like to try lithium/medications as prescribed.    She remains inappropriately focused on discharge.     **She continues to get multiple prns for agitation.**    The patient denies any side effects to medications.        Psychiatric ROS (observed, reported, or endorsed/denied):  Depressed mood - No  Interest/pleasure/anhedonia: No  Guilt/hopelessness/worthlessness - No  Changes in Sleep - No  Changes in Appetite - No  Changes in Concentration - No  Changes in Energy - No  PMA/R- No  Suicidal- active/passive ideations - " denies  Homicidal ideations: active/passive ideations - denies    Hallucinations - No  Delusions - No  Disorganized behavior - No  Disorganized speech - No  Negative symptoms - No    Elevated mood - Continuing  Decreased need for sleep - Continuing  Grandiosity - Continuing  Racing thoughts - Continuing  Impulsivity - Continuing  Irritability- Continuing  Increased energy - Continuing  Distractibility - Continuing  Increase in goal-directed activity or PMA- Continuing    Symptoms of GAYE - No  Symptoms of Panic Disorder- No  Symptoms of PTSD - No        Overall progress: Patient is showing no improvement on the Unit to date      PSYCHOTHERAPY ADD-ON [id number]   16-37 minutes    Time: 16 minutes  Participants: Met with patient    Therapeutic Intervention Type: insight oriented psychotherapy, behavior modifying psychotherapy, supportive psychotherapy, interactive psychotherapy  Why chosen therapy is appropriate versus another modality: relevant to diagnosis, patient responds to this modality, evidence based practice    Target symptoms: mood disorder  Primary focus: gopi  Psychotherapeutic techniques: supportive and psycho-educational techniques; motivational techniques for tx adherence     Outcome monitoring methods: self-report, observation    Patient's response to intervention:  The patient's response to intervention is accepting.    Progress toward goals:  The patient's progress toward goals is limited.        Medical ROS  Review of Systems   Constitutional:  Negative for chills and fever.   HENT:  Negative for hearing loss.    Eyes:  Negative for blurred vision and double vision.   Respiratory:  Negative for shortness of breath.    Cardiovascular:  Negative for chest pain and palpitations.   Gastrointestinal:  Negative for constipation, diarrhea, nausea and vomiting.   Genitourinary:  Negative for dysuria.   Musculoskeletal:  Negative for back pain and neck pain.   Skin:  Negative for rash.   Neurological:  Negative  "for dizziness and headaches.   Endo/Heme/Allergies:  Negative for environmental allergies.       PAST MEDICAL HISTORY   Past Medical History:   Diagnosis Date    Anxiety     Bipolar disorder     Depression     Hallucination     Headache     History of psychiatric hospitalization     Hx of psychiatric care     Psychiatric problem     Psychosis     PTSD (post-traumatic stress disorder)     Therapy            PSYCHOTROPIC MEDICATIONS   Scheduled Meds:   clonazePAM  1 mg Oral TID    folic acid  1 mg Oral Daily    hydrocortisone   Rectal BID    lithium  300 mg Oral Q12H    multivitamin  1 tablet Oral Daily    QUEtiapine  100 mg Oral Daily    QUEtiapine  600 mg Oral QHS    thiamine  100 mg Oral Daily     Continuous Infusions:  PRN Meds:.calcium carbonate, chlorproMAZINE, chlorproMAZINE, hydrOXYzine HCL, magnesium hydroxide 400 mg/5 ml, nicotine        EXAMINATION    VITALS   Vitals:    09/17/22 1941 09/18/22 0800 09/18/22 1926 09/19/22 0729   BP: 112/65 132/66 105/61 108/69   BP Location: Left arm Right arm Right arm Left arm   Patient Position: Sitting Sitting Sitting Sitting   Pulse: 83 104 78 (!) 112   Resp: 18 18 18 18   Temp: 97.6 °F (36.4 °C) 97.6 °F (36.4 °C) 97.7 °F (36.5 °C) 97.6 °F (36.4 °C)   TempSrc: Temporal Temporal Temporal Temporal   Weight:  101.2 kg (223 lb 1.7 oz)     Height:           Body mass index is 36.01 kg/m².        CONSTITUTIONAL  General Appearance: unremarkable, age appropriate    MUSCULOSKELETAL  Muscle Strength and Tone:no tremor, no tic  Abnormal Involuntary Movements: None  Gait and Station: non-ataxic    PSYCHIATRIC   Level of Consciousness: awake and alert   Orientation: person, place, time, and situation  Grooming: Casually dressed and Well groomed  Psychomotor Behavior: normal, cooperative  Speech: normal tone, normal rate, normal pitch, normal volume  Language: grossly intact  Mood: "fine'  Affect: Inappropriate and Incongruent with mood  Thought Process: tangential  Associations: " loose   Thought Content: denies SI, denies HI, and no delusions  Perceptions: denies AH and denies  VH  Memory: Able to recall past events, Remote intact, and Recent intact  Attention:Easily distracted  Fund of Knowledge: Vocabulary appropriate   Estimate if Intelligence:  Average based on work/education history, vocabulary and mental status exam  Insight: poor awareness of illness  Judgment:  poor AEB recent behavior        DIAGNOSTIC TESTING   Laboratory Results  No results found for this or any previous visit (from the past 24 hour(s)).      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  Suicidal ideations  Homicidal ideations           PROBLEM LIST AND MANAGEMENT PLANS        Bipolar disorder, type I, MRE manic:  - stop zoloft 2/2 gopi  - increased home seroquel from 300 to 400 mg PO qhs -increase to 450 mg PO qhs tonight (titrating slowing given low BP)- increase to 50 mg po q day and 500 mg po q HS- increase to 50/600- increase 100/600 today   -started/continue adjunctive Lithium CR at 300 mg po BID- check level tomorrow  -start adjunctive klonopin 1 mg po BID- increase to 1 mg po TID; will taper off as meds reach therapeutic levels  - pt counseled  - follow up with outpatient mental health provider after discharge     Psychosocial stressors  - pt counseled  - SW consulted for assistance with additional resources      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  - started/continue at hydroxyzine 50 mg PO qhs PRN  - started melatonin 9 mg PO qhs PRN     Anxiety:pt counseled  -klonopin as above  -vistaril prn     Suicidal ideations  - continue psychiatric hospitalization  - provide psychotherapeutic interventions and medication management  - monitor         Homicidal ideations  - continue psychiatric hospitalization  - provide psychotherapeutic  interventions and medication management  - monitor           Discussed diagnosis, risks and benefits of proposed treatment vs alternative treatments vs no treatment, potential side effects of these treatments and the inherent unpredictability of treatment. The patient expresses understanding of the above and displays the capacity to agree with this treatment given said understanding. Patient also agrees that, currently, the benefits outweigh the risks and would like to pursue/continue treatment at this time.    Any medications being used off-label were discussed with the patient inclusive of the evidence base for the use of the medications and consent was obtained for the off-label use of the medication.       DISCHARGE PLANNING  Expected Disposition Plan: Home or Self Care      NEED FOR CONTINUED HOSPITALIZATION  Psychiatric illness continues to pose a potential threat to life or bodily function, of self or others, thereby requiring the need for continued inpatient psychiatric hospitalization: Yes, due to: gravely disabled, as evidenced by:  Ongoing concerns with grave disability with patient unable to perform basic feeding, hygiene and dressing activities without significant constant support.    Protective inpatient pyschiatric hospitalization required while a safe disposition plan is enacted: Yes    Patient stabilized and ready for discharge from inpatient psychiatric unit: No        STAFF:   Kit Gambino MD  Psychiatry

## 2022-09-19 NOTE — PSYCH
Pt banging on dr office door, yelling for dr Gambino.  Informed pt that the dr is not here anymore and instructed her to stop banging on the door.  Pt stopped, redirected easily.

## 2022-09-19 NOTE — PROGRESS NOTES
"   09/19/22 1050   [facility] Group Therapy   Group Name Education   Specific Interventions Coping Skills Training  (positive steps to wellbeing)   Participation Level Other (see comments)   Participation Quality Disruptive;Needs Redirection   Insight/Motivation Limited   Affect/Mood Display Other (see comments)  (elevated)   Cognition Alert   Psychomotor WNL   Patient presents irritable, intrusive, disruptive, attempt to control, requires constant redirection, focused on being discharged, had difficulty agreeing to disagree with others, told staff "if you try to talk over me I'll get worse." Patient constantly interrupting when peers shared and attempt to involve peers into negative behavior. Patient became upset when not getting her was, balled up her handout and writing pen and threw both on the floor, stood up and says "am I acting erratic enough!"  "

## 2022-09-19 NOTE — PLAN OF CARE
Following POC.  Makes needs known.  Denies SI/HI,AVH.  Denies pain.  Patient out on unit, intrusive, demanding.  Out on the unit most of the evening.  Minimal interactions with peers.  Stands by other patients room and stares at them.  Redirectable much of the time.  PRN medication administered, po. Effective.  Medication complaint.  Appetite is good.  Showered.  Encouraged, cooperation and calmness.  Free of fall.

## 2022-09-19 NOTE — NURSING
Pt sleeping at this time, slept 8.5 hrs with 1 awakenings. NAD. Resp even and unlabored.Pathways clear,bed in low position. Q 15 min safety check ongoing.All precautions maintained.
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
## 2022-09-19 NOTE — PLAN OF CARE
Pt still somewhat elevated and restless.  At nurses station frequently.  Slightly irritable in treatment team with , but she redirected easily.  Pt still with little insight into illness.  Spoke with pt  with pt permission.   states he feels she sounds better when they talk on the phone.  Pt discharge focused, does not want to be here anymore.  She was able to be redirected easily.  She denies any S/I or H/I.  Will continue to monitor and redirect as needed.

## 2022-09-20 LAB — LITHIUM SERPL-SCNC: 0.4 MMOL/L (ref 0.6–1.2)

## 2022-09-20 PROCEDURE — 36415 COLL VENOUS BLD VENIPUNCTURE: CPT | Performed by: PSYCHIATRY & NEUROLOGY

## 2022-09-20 PROCEDURE — 99233 SBSQ HOSP IP/OBS HIGH 50: CPT | Mod: ,,, | Performed by: PSYCHIATRY & NEUROLOGY

## 2022-09-20 PROCEDURE — 63600175 PHARM REV CODE 636 W HCPCS: Performed by: PSYCHIATRY & NEUROLOGY

## 2022-09-20 PROCEDURE — 90833 PR PSYCHOTHERAPY W/PATIENT W/E&M, 30 MIN (ADD ON): ICD-10-PCS | Mod: ,,, | Performed by: PSYCHIATRY & NEUROLOGY

## 2022-09-20 PROCEDURE — 90833 PSYTX W PT W E/M 30 MIN: CPT | Mod: ,,, | Performed by: PSYCHIATRY & NEUROLOGY

## 2022-09-20 PROCEDURE — 25000003 PHARM REV CODE 250: Performed by: PSYCHIATRY & NEUROLOGY

## 2022-09-20 PROCEDURE — 11400000 HC PSYCH PRIVATE ROOM

## 2022-09-20 PROCEDURE — 80178 ASSAY OF LITHIUM: CPT | Performed by: PSYCHIATRY & NEUROLOGY

## 2022-09-20 PROCEDURE — 25000003 PHARM REV CODE 250: Performed by: STUDENT IN AN ORGANIZED HEALTH CARE EDUCATION/TRAINING PROGRAM

## 2022-09-20 PROCEDURE — 99233 PR SUBSEQUENT HOSPITAL CARE,LEVL III: ICD-10-PCS | Mod: ,,, | Performed by: PSYCHIATRY & NEUROLOGY

## 2022-09-20 RX ORDER — LITHIUM CARBONATE 300 MG/1
600 TABLET, FILM COATED, EXTENDED RELEASE ORAL EVERY 12 HOURS
Status: DISCONTINUED | OUTPATIENT
Start: 2022-09-20 | End: 2022-09-21

## 2022-09-20 RX ADMIN — QUETIAPINE FUMARATE 600 MG: 300 TABLET ORAL at 08:09

## 2022-09-20 RX ADMIN — CLONAZEPAM 1 MG: 1 TABLET ORAL at 08:09

## 2022-09-20 RX ADMIN — FOLIC ACID 1 MG: 1 TABLET ORAL at 08:09

## 2022-09-20 RX ADMIN — THERA TABS 1 TABLET: TAB at 08:09

## 2022-09-20 RX ADMIN — CLONAZEPAM 1 MG: 1 TABLET ORAL at 02:09

## 2022-09-20 RX ADMIN — LITHIUM CARBONATE 300 MG: 300 TABLET, FILM COATED, EXTENDED RELEASE ORAL at 08:09

## 2022-09-20 RX ADMIN — Medication 100 MG: at 08:09

## 2022-09-20 RX ADMIN — QUETIAPINE FUMARATE 100 MG: 100 TABLET ORAL at 08:09

## 2022-09-20 RX ADMIN — LITHIUM CARBONATE 600 MG: 300 TABLET, FILM COATED, EXTENDED RELEASE ORAL at 08:09

## 2022-09-20 RX ADMIN — CHLORPROMAZINE HYDROCHLORIDE 100 MG: 100 TABLET, COATED ORAL at 08:09

## 2022-09-20 NOTE — PLAN OF CARE
Pt mood is de-escalating.  Still continues to be intrusive and argumentative.  These seem to be just defiant behaviors.  Pt is med compliant and redirectable.  She denies any S/I or H/I and states she is just ready for discharge.  No acute distress apparent at this time, will continue to monitor.

## 2022-09-20 NOTE — PLAN OF CARE
Pt sleeping at this time, slept 7.5 hrs,resp even and unlabored.Pathways clear,bed in low position,Q15 min safety check ongoing.All precautions maintained.

## 2022-09-20 NOTE — PROGRESS NOTES
PSYCHIATRY DAILY INPATIENT PROGRESS NOTE  SUBSEQUENT HOSPITAL VISIT    ENCOUNTER DATE: 9/20/2022  SITE: [facility]    DATE OF ADMISSION: 9/14/2022  3:02 PM  LENGTH OF STAY: 6 days      CHIEF COMPLAINT   Tesha Macias is a 27 y.o. female, seen during daily lucas rounds on the inpatient unit.  Tesha Macias presented with the chief complaint of mood disorder      The patient was seen and examined. The chart was reviewed.     Reviewed notes from Rns, [facility], and [facility] and labs from the last 24 hours.    The patient's case was discussed with the treatment team/care providers today including [facility], [facility], and [facility]    Staff reports less behavioral or management issues.       The patient has been compliant with treatment.      Subjective 09/20/2022       Today the patient continues to exhibit manic behaviors, although her symptoms appear diminished today. She  required less PRN medications for agitation. Her thought process remains derailed but is somewhat more gaol directed.  -She has variable insight and judgement. She is less difficult to redirect. Fx/bx impairment remains severe    She reports that she is ok with not breast feeding. She would like to try lithium/medications as prescribed.    She remains inappropriately focused on discharge.         The patient denies any side effects to medications.        Psychiatric ROS (observed, reported, or endorsed/denied):  Depressed mood - No  Interest/pleasure/anhedonia: No  Guilt/hopelessness/worthlessness - No  Changes in Sleep - No  Changes in Appetite - No  Changes in Concentration - No  Changes in Energy - No  PMA/R- No  Suicidal- active/passive ideations - denies  Homicidal ideations: active/passive ideations - denies    Hallucinations - No  Delusions - No  Disorganized behavior - No  Disorganized speech - No  Negative symptoms - No    Elevated mood - Continuing  Decreased need for sleep - Continuing  Grandiosity - Continuing  Racing thoughts - Continuing  Impulsivity -  Continuing  Irritability- Continuing  Increased energy - Continuing  Distractibility - Continuing  Increase in goal-directed activity or PMA- Continuing    Symptoms of GAYE - No  Symptoms of Panic Disorder- No  Symptoms of PTSD - No        Overall progress: Patient is showing no improvement on the Unit to date      PSYCHOTHERAPY ADD-ON [id number]   16-37 minutes    Time: 16 minutes  Participants: Met with patient    Therapeutic Intervention Type: insight oriented psychotherapy, behavior modifying psychotherapy, supportive psychotherapy, interactive psychotherapy  Why chosen therapy is appropriate versus another modality: relevant to diagnosis, patient responds to this modality, evidence based practice    Target symptoms: mood disorder  Primary focus: gopi  Psychotherapeutic techniques: supportive and psycho-educational techniques; motivational techniques for tx adherence     Outcome monitoring methods: self-report, observation    Patient's response to intervention:  The patient's response to intervention is accepting.    Progress toward goals:  The patient's progress toward goals is limited.        Medical ROS  Review of Systems   Constitutional:  Negative for chills and fever.   HENT:  Negative for hearing loss.    Eyes:  Negative for blurred vision and double vision.   Respiratory:  Negative for shortness of breath.    Cardiovascular:  Negative for chest pain and palpitations.   Gastrointestinal:  Negative for constipation, diarrhea, nausea and vomiting.   Genitourinary:  Negative for dysuria.   Musculoskeletal:  Negative for back pain and neck pain.   Skin:  Negative for rash.   Neurological:  Negative for dizziness and headaches.   Endo/Heme/Allergies:  Negative for environmental allergies.       PAST MEDICAL HISTORY   Past Medical History:   Diagnosis Date    Anxiety     Bipolar disorder     Depression     Hallucination     Headache     History of psychiatric hospitalization     Hx of psychiatric care      "Psychiatric problem     Psychosis     PTSD (post-traumatic stress disorder)     Therapy            PSYCHOTROPIC MEDICATIONS   Scheduled Meds:   clonazePAM  1 mg Oral TID    folic acid  1 mg Oral Daily    hydrocortisone   Rectal BID    lithium  300 mg Oral Q12H    multivitamin  1 tablet Oral Daily    QUEtiapine  100 mg Oral Daily    QUEtiapine  600 mg Oral QHS    thiamine  100 mg Oral Daily     Continuous Infusions:  PRN Meds:.calcium carbonate, chlorproMAZINE, chlorproMAZINE, hydrOXYzine HCL, magnesium hydroxide 400 mg/5 ml, nicotine        EXAMINATION    VITALS   Vitals:    09/18/22 1926 09/19/22 0729 09/19/22 2045 09/20/22 0736   BP: 105/61 108/69 128/62 130/80   BP Location: Right arm Left arm Left arm Right arm   Patient Position: Sitting Sitting Sitting Sitting   Pulse: 78 (!) 112 85 88   Resp: 18 18 18 20   Temp: 97.7 °F (36.5 °C) 97.6 °F (36.4 °C) 97.5 °F (36.4 °C) 97.2 °F (36.2 °C)   TempSrc: Temporal Temporal Temporal Temporal   Weight:       Height:           Body mass index is 36.01 kg/m².        CONSTITUTIONAL  General Appearance: unremarkable, age appropriate    MUSCULOSKELETAL  Muscle Strength and Tone:no tremor, no tic  Abnormal Involuntary Movements: None  Gait and Station: non-ataxic    PSYCHIATRIC   Level of Consciousness: awake and alert   Orientation: person, place, time, and situation  Grooming: Casually dressed and Well groomed  Psychomotor Behavior: normal, cooperative  Speech: normal tone, normal rate, normal pitch, normal volume  Language: grossly intact  Mood: "fine'  Affect: Inappropriate and Incongruent with mood  Thought Process: tangential  Associations: loose   Thought Content: denies SI, denies HI, and no delusions  Perceptions: denies AH and denies  VH  Memory: Able to recall past events, Remote intact, and Recent intact  Attention:Easily distracted  Fund of Knowledge: Vocabulary appropriate   Estimate if Intelligence:  Average based on work/education history, vocabulary and mental " status exam  Insight: poor awareness of illness  Judgment:  poor AEB recent behavior        DIAGNOSTIC TESTING   Laboratory Results  No results found for this or any previous visit (from the past 24 hour(s)).      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  Suicidal ideations  Homicidal ideations           PROBLEM LIST AND MANAGEMENT PLANS        Bipolar disorder, type I, MRE manic:  - stop zoloft 2/2 gopi  - increased home seroquel from 300 to 400 mg PO qhs -increase to 450 mg PO qhs tonight (titrating slowing given low BP)- increase to 50 mg po q day and 500 mg po q HS- increase to 50/600- increase to/continue at 100/600   -started/continue adjunctive Lithium CR at 300 mg po BID- check level today- (pending)  -start adjunctive klonopin 1 mg po BID- increase to 1 mg po TID; will taper off as meds reach therapeutic levels  - pt counseled  - follow up with outpatient mental health provider after discharge     Psychosocial stressors  - pt counseled  - SW consulted for assistance with additional resources      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  - started/continue at hydroxyzine 50 mg PO qhs PRN  - started melatonin 9 mg PO qhs PRN     Anxiety:pt counseled  -klonopin as above  -vistaril prn     Suicidal ideations  - continue psychiatric hospitalization  - provide psychotherapeutic interventions and medication management  - monitor         Homicidal ideations  - continue psychiatric hospitalization  - provide psychotherapeutic interventions and medication management  - monitor           Discussed diagnosis, risks and benefits of proposed treatment vs alternative treatments vs no treatment, potential side effects of these treatments and the inherent unpredictability of treatment. The patient expresses understanding of the above and displays the  capacity to agree with this treatment given said understanding. Patient also agrees that, currently, the benefits outweigh the risks and would like to pursue/continue treatment at this time.    Any medications being used off-label were discussed with the patient inclusive of the evidence base for the use of the medications and consent was obtained for the off-label use of the medication.       DISCHARGE PLANNING  Expected Disposition Plan: Home or Self Care      NEED FOR CONTINUED HOSPITALIZATION  Psychiatric illness continues to pose a potential threat to life or bodily function, of self or others, thereby requiring the need for continued inpatient psychiatric hospitalization: Yes, due to: gravely disabled, as evidenced by:  Ongoing concerns with grave disability with patient unable to perform basic feeding, hygiene and dressing activities without significant constant support.    Protective inpatient pyschiatric hospitalization required while a safe disposition plan is enacted: Yes    Patient stabilized and ready for discharge from inpatient psychiatric unit: No        STAFF:   Kit Gambino MD  Psychiatry

## 2022-09-20 NOTE — PLAN OF CARE
"Pt restless and anxious during waking hours,pt stated that she was "tired of all the bull shit from the doctor", pt redirectable, no outburst, denies hallucinations, no acute distress noted.  "

## 2022-09-21 VITALS
HEIGHT: 66 IN | BODY MASS INDEX: 36.19 KG/M2 | HEART RATE: 87 BPM | RESPIRATION RATE: 18 BRPM | TEMPERATURE: 98 F | WEIGHT: 225.19 LBS | DIASTOLIC BLOOD PRESSURE: 78 MMHG | SYSTOLIC BLOOD PRESSURE: 117 MMHG

## 2022-09-21 PROCEDURE — 90833 PR PSYCHOTHERAPY W/PATIENT W/E&M, 30 MIN (ADD ON): ICD-10-PCS | Mod: ,,, | Performed by: PSYCHIATRY & NEUROLOGY

## 2022-09-21 PROCEDURE — 63600175 PHARM REV CODE 636 W HCPCS: Performed by: PSYCHIATRY & NEUROLOGY

## 2022-09-21 PROCEDURE — 90833 PSYTX W PT W E/M 30 MIN: CPT | Mod: ,,, | Performed by: PSYCHIATRY & NEUROLOGY

## 2022-09-21 PROCEDURE — 99239 HOSP IP/OBS DSCHRG MGMT >30: CPT | Mod: ,,, | Performed by: PSYCHIATRY & NEUROLOGY

## 2022-09-21 PROCEDURE — 25000003 PHARM REV CODE 250: Performed by: STUDENT IN AN ORGANIZED HEALTH CARE EDUCATION/TRAINING PROGRAM

## 2022-09-21 PROCEDURE — 25000003 PHARM REV CODE 250: Performed by: PSYCHIATRY & NEUROLOGY

## 2022-09-21 PROCEDURE — 99239 PR HOSPITAL DISCHARGE DAY,>30 MIN: ICD-10-PCS | Mod: ,,, | Performed by: PSYCHIATRY & NEUROLOGY

## 2022-09-21 RX ORDER — LITHIUM CARBONATE 300 MG/1
300 TABLET, FILM COATED, EXTENDED RELEASE ORAL EVERY 12 HOURS
Status: DISCONTINUED | OUTPATIENT
Start: 2022-09-21 | End: 2022-09-21 | Stop reason: HOSPADM

## 2022-09-21 RX ORDER — LITHIUM CARBONATE 300 MG/1
300 TABLET, FILM COATED, EXTENDED RELEASE ORAL EVERY 12 HOURS
Qty: 60 TABLET | Refills: 2 | Status: ON HOLD | OUTPATIENT
Start: 2022-09-21 | End: 2023-07-05

## 2022-09-21 RX ORDER — QUETIAPINE FUMARATE 100 MG/1
100 TABLET, FILM COATED ORAL DAILY
Qty: 30 TABLET | Refills: 2 | Status: ON HOLD | OUTPATIENT
Start: 2022-09-22 | End: 2023-07-05

## 2022-09-21 RX ORDER — QUETIAPINE FUMARATE 300 MG/1
600 TABLET, FILM COATED ORAL NIGHTLY
Qty: 60 TABLET | Refills: 2 | Status: ON HOLD | OUTPATIENT
Start: 2022-09-21 | End: 2023-07-05

## 2022-09-21 RX ADMIN — FOLIC ACID 1 MG: 1 TABLET ORAL at 08:09

## 2022-09-21 RX ADMIN — QUETIAPINE FUMARATE 100 MG: 100 TABLET ORAL at 08:09

## 2022-09-21 RX ADMIN — CLONAZEPAM 1 MG: 1 TABLET ORAL at 08:09

## 2022-09-21 RX ADMIN — LITHIUM CARBONATE 600 MG: 300 TABLET, FILM COATED, EXTENDED RELEASE ORAL at 08:09

## 2022-09-21 RX ADMIN — Medication 100 MG: at 08:09

## 2022-09-21 RX ADMIN — THERA TABS 1 TABLET: TAB at 08:09

## 2022-09-21 RX ADMIN — CHLORPROMAZINE HYDROCHLORIDE 100 MG: 100 TABLET, COATED ORAL at 05:09

## 2022-09-21 NOTE — PLAN OF CARE
"Pt states that he feels "good" today, spending majority of time in hallway and activity room, interacting with staff/peers. Denies SI/HI. Denies hallucinations. Appetite adequate. Denies sleep disturbances. Medication complaint. Remains calm and cooperative. NADN. Safety precautions remain in place.  "

## 2022-09-21 NOTE — NURSING
Family member is here for transportation to home. Patient received all personal items, prescriptions, and follow up appointment and discharge papers given.  Instructed patient on Covid-19 precautions. Patient voiced understanding of all AVS discharge information. Patient denies distress, denies SI/HI. NAD noted. Patient escorted by Tyros downstairs for family member to transport to home.   NAD noted.

## 2022-09-21 NOTE — NURSING
I spoke to Nancy with [facility] in the [facility]. The patient's follow up appointment will be on 9/22/2022 at 0830. [place] will provide transportation for this patient to get to the clinic. The patient's contact number was given as [phone]. Discussed and educated this patient on her follow up appointment and transportation to the clinic. Patient voiced understanding of all information as a well as all information that is on her discharge AVS.

## 2022-09-21 NOTE — NURSING
Pt awake at this time, slept 7 hrs with no awakenings. NADN. Resp even and unlabored. Pathways clear, bed in low position. Q 15 min safety check ongoing. Safety precautions remain in place.

## 2022-09-21 NOTE — PROGRESS NOTES
PSYCHOTHERAPY ADD-ON [id number]   16-37 minutes     Time: 16 minutes  Participants: Met with patient     Therapeutic Intervention Type: insight oriented psychotherapy, behavior modifying psychotherapy, supportive psychotherapy, interactive psychotherapy  Why chosen therapy is appropriate versus another modality: relevant to diagnosis, patient responds to this modality, evidence based practice     Target symptoms: mood disorder  Primary focus: gopi  Psychotherapeutic techniques: supportive and psycho-educational techniques; motivational techniques for tx adherence  -safety planning and wrap up session      Outcome monitoring methods: self-report, observation     Patient's response to intervention:  The patient's response to intervention is accepting.     Progress toward goals:  The patient's progress toward goals is fair    Kit Gambino MD  Psychiatry

## 2022-09-21 NOTE — DISCHARGE SUMMARY
"Discharge Summary  Psychiatry    Admit Date: 2022    Discharge Date and Time:  2022 8:54 AM    Attending Physician: No att. providers found; Kit Gambino MD    Discharge Provider: Kit Gambino MD    Reason for Admission:  disorganized behavior    History of Present Illness:   The patient presented to the ER on 2022 .     The patient was medically cleared and admitted to the [facility].        Per ED DO:       Pt reports "Im tired of people calling me crazy and I just need to prove I'm not". Pt states "I need to get out of the system". Pt with flight of ideas in triage.       27 year old female , last delivered last month presents to the ED with her father for psychosis. Starting a month ago after she delivered, she started acting very erratic. Not sleeping, not eating/drinking much, talking randomness, difficult to interact with. She has been taking care of her  per dad but he expresses concerns that she takes him on walks next to the road. This occurred after her 2nd pregnancy and she had to be hospitalized for 3 months per mom. Dad also states she was wishing someone else would die today but didn't make a plan to harm them.     The patient is alert, oriented. Claiming to be part of the [place]. Agitated, not cooperative, hyperverbal, stating "fu** the sytem" and that she needs to leave.         Per RN:  Pt requiring frequent redirection, patient requiring multiple staff members present for elopement precautions, pt yelling profanity, unable to redirect, md notified, new orders noted        Per RN:  States she does not understand why she is here and wants to leave, but pt was easily redirected after explaining pec process.  Pt states she was in an argument with her sister after a family member just recently , and she said some things that some of her family thought was "weird".  Stated she just made a comment about death and it was taken the wrong way.  Pt does " "have a long psych hx with previous inpatient admits.  Pt recently had a baby and has been off most of her previous psych meds.  Pt had agitated behavior in the ER and was given IM meds for agitation.         Per RN:  Asked female tech "Where's the exits at"?  Calm when she asked.   Interacting with peers.  Accepted snacks.  Prn hydroxyzine given at 20:07 to assist with insomnia.  Complained about hemorrhoids.         Psychiatric interview:  "I'm having a lot of psychological issues.... things are not going the way I want... I tested positive for hep C.. I went into labor while I was pressure washing, I wouldn't put the baby at risk, the woman said I was dehydrated, I said no I'm just active, I know not to over work myself, she had the wrong judgment of me, all the reports were online for me to read, so I know what they thought of me... I'm very what you see is what you get, then I drank all this water because she thought I was dehydrated, she checked how dilated I was, they were measuring the contractions, they were about to send us home." Patient rambling, telling a long story about her child's birth. She states after baby was born, "I didn't sleep because I wanted to spend every second with my baby... my psychosis traumatized my ... I was hospitalized for 2 weeks after the baby was born... but I started not sleeping again so I had to come back here to visit my mom and dad, so my  could have peace." She states baby is now a 1 month old. She does not know how long her symptoms have been back, "it's all a blur." She states she has been [facility] and has been compliant with her medications, seroquel and zoloft. She states her last "psychotic episode..." after her baby was born, "I was outside beating something with a stick because I was so angry I had to beat something.... I don't want to be angry holding my baby."            Per chart review from encounter " 8/21/22:  Patient who is currently 1 week postpartum presents the emergency department for reports of difficulty sleeping. Patient does not provide any information on her own and history is strictly provided by her  and mother who were both at bedside.  states that patient delivered 1 week ago here at [facility].  says that did not have a good experience any feels that this initiated the patient's current level of stress and difficulty with sleep.  states the patient generally is not good under pressure or in stressful situations.  says patient has not slept since they got home with the baby. Patient had contacted Dr. Donald signs office for this issue and reportedly told to take Benadryl. Patient has reportedly taken Benadryl but is not helped her get any additional sleep.  says the patient is starting to act bizarrely. Patient denies any suicidal thoughts or any thoughts about harming the baby. Both the patient and  who are at bedside comfortable with the mother and baby going home. Of note, review of patient's previous records shows that she has had issues with insomnia previously which reportedly led to suicidal thoughts and need for the patient to be placed in a psychiatric facility              Symptoms of Depression: diminished mood - No, loss of interest/anhedonia - No;      +h/o MDE, not currently active     Changes in Sleep: trouble with initiation- Yes, maintenance, - Yes early morning awakening with inability to return to sleep - No, hypersomnolence - No     Suicidal- active/passive ideations - No, denies however, yes per vague report , organized plans, future intentions - No     Homicidal ideations: active/passive ideations - No, patient denies however vague report previously per EMR , organized plans, future intentions - No     Symptoms of psychosis: hallucinations - No, delusions - No, disorganized speech - No, disorganized behavior or  "abnormal motor behavior - No, or negative symptoms (diminshed emotional expression, avolition, anhedonia, alogia, asociality) - No, active phase symptoms >1 month - No, continuous signs of illness > 6 months - No, since onset of illness decreased level of functioning present - No     Symptoms of gopi or hypomania: elevated, expansive, or irritable mood with increased energy or activity - Yes; > 4 days - Yes,  >7 days - Yes; with inflated self-esteem or grandiosity - Yes, decreased need for sleep - Yes, increased rate of speech - Yes, FOI or racing thoughts - Yes, distractibility - Yes, increased goal directed activity or PMA - Yes, risky/disinhibited behavior - Yes     Symptoms of GAYE: excessive anxiety/worry/fear, more days than not, about numerous issues - No, ongoing for >6 months - No, difficult to control - No, with restlessness - No, fatigue - No, poor concentration - No, irritability - No, muscle tension - No, sleep disturbance - No; causes functionally impairing distress - No     Symptoms of Panic Disorder: recurrent panic attacks (palpitations/heart racing, sweating, shakiness, dyspnea, choking, chest pain/discomfort, Gi symptoms, dizzy/lightheadedness, hot/col flashes, paresthesias, derealization, fear of losing control or fear of dying or fear of "going crazy") - No, precipitated - No, un-precipitated - No, source of worry and/or behavioral changes secondary for 1 month or longer- No, agoraphobia - No     Symptoms of PTSD: h/o trauma exposure - No; re-experiencing/intrusive symptoms - No, avoidant behavior - No, 2 or more negative alterations in cognition or mood - No, 2 or more hyperarousal symptoms - No; with dissociative symptoms - No, ongoing for 1 or more  months - No     Symptoms of OCD: obsessions (recurrent thoughts/urges/images; intrusive and/or unwanted; uses other thoughts/actions to suppress) - No; compulsions (repetitive behaviors used to lower distress/anxiety/obsessions) - No, " time-consuming (over 1 hour per day) or cause significant distress/impairment - - No     Symptoms of Anorexia: restriction of caloric intake leading to significantly low body weight - No, intense fear of gaining weight or persistent behavior that interferes with weight gain even thought at a significantly low weight - No, disturbance in the way in which one's body weight or shape is experienced, undue influence of body weight or shape on self evaluation, or persistent lack of recognition of the seriousness of the current low body weight - No     Symptoms of Bulimia: recurrent episodes of binge eating (definitely larger amount  than what others would eat and lack of a sense of control over eating during episode) - No, recurrent inappropriate compensatory behaviors in order to prevent weight gain (fasting, medications, exercise, vomiting) - No, binges and compensatory behaviors both occur on average at least once a week for 3 months - No, self evaluations is unduly influenced by body shape/weight- - No             Procedures Performed: * No surgery found *    Hospital Course:    Patient was admitted to the inpatient psychiatry unit after being medically cleared in the ED. Chart and labs were reviewed. The patient was stabilized as follows:      Bipolar disorder, type I, MRE manic:  - stop zoloft 2/2 gopi  - increased home seroquel from 300 to 400 mg PO qhs -increase to 450 mg PO qhs tonight- increase to 50 mg po q day and 500 mg po q HS- increase to 50/600- increase to/continue at 100/600   -started/continue adjunctive Lithium CR at 300 mg po BID- check level today- (pending)    -start adjunctive klonopin 1 mg po BID- increase to 1 mg po TID- decrease to BID then stop;   - pt counseled  - follow up with outpatient mental health provider after discharge     Psychosocial stressors  - pt counseled  - SW consulted for assistance with additional resources      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 Patient was monitored for any side effects. A meeting was held with multidisciplinary team prior to discharge and pt's diagnosis, current medications, and follow up were discussed. The patient has been compliant with treatment and can adequately attend to activities of daily living in an independent manner. The patient denies any side effects. The patient denies SI, HI, plan or intent for self harm or harm to others. The patient is no longer a danger to self or others nor gravely disabled disabled. Patient discharged  in stable condition with scheduled outpatient follow up.    The patient reports improved symptoms as documented below. She is requesting discharge. She is currently stable for discharge home and is able willing to attend outpatient care. She is hopeful, future oriented and goal directed. She readily discusses her short and long term goals.     She was severely acting out in order to secure discharge,. Her behaviors were NOT secondary to gopi and were clearly manipulative in nature.     Psychiatric ROS (observed, reported, or endorsed/denied):  Depressed mood - No  Interest/pleasure/anhedonia: No  Guilt/hopelessness/worthlessness - No  Changes in Sleep - No  Changes in Appetite - No  Changes in Concentration - No  Changes in Energy - No  PMA/R- No  Suicidal- active/passive ideations - denies  Homicidal ideations: active/passive ideations - denies     Hallucinations - No  Delusions - No  Disorganized behavior - No  Disorganized speech - No  Negative symptoms - No     Elevated mood - improved  Decreased need for sleep - improved  Grandiosity - improved  Racing thoughts - improved  Impulsivity - improved  Irritability- improved  Increased energy - improved  Distractibility -  improved  Increase in goal-directed activity or PMA- improved     Symptoms of GAYE - No  Symptoms of Panic Disorder- No  Symptoms of PTSD - No      Discussed diagnosis, risks and benefits of proposed treatment vs alternative treatments vs no treatment, and potential side effects of these treatments.  The patient expresses understanding of the above and displays the capacity to agree with this treatment given said understanding.  Patient also agrees that, currently, the benefits outweigh the risks and would like to pursue treatment at this time.      Discharge MSE: stated age, casually dressed, well groomed.  No psychomotor agitation or retardation.  No abnormal involuntary movements.  Gait normal.  Speech normal, conversational.  Language fluent English. Mood fine.  Affect normal range, pleasant, euthymic.  Thought process linear.  Associations intact.  Denies suicidal or homicidal ideation.  Denies auditory hallucinations, paranoid ideation, ideas of reference.  Memory intact.  Attention intact.  Fund of knowledge intact.  Insight intact.  Judgment intact.  Alert and oriented to person, place, time.      Tobacco Usage:  Is patient a smoker? No  Does patient want prescription for Tobacco Cessation? No  Does patient want counseling for Tobacco Cessation? No    If patient would like to quit, then over the counter nicotine patch could be used. The patient could also follow up with his PCP or psychiatric provider for other alternatives.     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    Labs:  Admission on 09/14/2022   Component Date Value Ref Range Status    Hemoglobin A1C 09/14/2022 5.1  4.0 - 5.6 % Final    Estimated Avg Glucose 09/14/2022 100  68 - 131 mg/dL Final    Cholesterol 09/14/2022 201 (H)  120 - 199 mg/dL Final    Triglycerides  09/14/2022 162 (H)  30 - 150 mg/dL Final    HDL 09/14/2022 47  40 - 75 mg/dL Final    LDL Cholesterol 09/14/2022 121.6  63.0 - 159.0 mg/dL Final    HDL/Cholesterol Ratio 09/14/2022 23.4  20.0 - 50.0 % Final    Total Cholesterol/HDL Ratio 09/14/2022 4.3  2.0 - 5.0 Final    Non-HDL Cholesterol 09/14/2022 154  mg/dL Final    Sodium 09/16/2022 140  136 - 145 mmol/L Final    Potassium 09/16/2022 4.2  3.5 - 5.1 mmol/L Final    Chloride 09/16/2022 105  95 - 110 mmol/L Final    CO2 09/16/2022 23  23 - 29 mmol/L Final    Glucose 09/16/2022 79  70 - 110 mg/dL Final    BUN 09/16/2022 15  6 - 20 mg/dL Final    Creatinine 09/16/2022 0.9  0.5 - 1.4 mg/dL Final    Calcium 09/16/2022 9.2  8.7 - 10.5 mg/dL Final    Total Protein 09/16/2022 7.6  6.0 - 8.4 g/dL Final    Albumin 09/16/2022 3.6  3.5 - 5.2 g/dL Final    Total Bilirubin 09/16/2022 0.3  0.1 - 1.0 mg/dL Final    Alkaline Phosphatase 09/16/2022 106  55 - 135 U/L Final    AST 09/16/2022 37  10 - 40 U/L Final    ALT 09/16/2022 66 (H)  10 - 44 U/L Final    Anion Gap 09/16/2022 12  8 - 16 mmol/L Final    eGFR 09/16/2022 >60  >60 mL/min/1.73 m^2 Final    Hepatitis B Surface Ag 09/16/2022 Non-reactive  Non-reactive Final    Hep B C IgM 09/16/2022 Non-reactive  Non-reactive Final    Hep A IgM 09/16/2022 Non-reactive  Non-reactive Final    Hepatitis C Ab 09/16/2022 Non-reactive  Non-reactive Final    Lithium Level 09/20/2022 0.4 (L)  0.6 - 1.2 mmol/L Final   Admission on 09/14/2022, Discharged on 09/14/2022   Component Date Value Ref Range Status    Specimen UA 09/14/2022 Urine, Clean Catch   Final    Color, UA 09/14/2022 Yellow  Yellow, Straw, Crystal Final    Appearance, UA 09/14/2022 Clear  Clear Final    pH, UA 09/14/2022 6.0  5.0 - 8.0 Final    Specific Gravity, UA 09/14/2022 <=1.005 (A)  1.005 - 1.030 Final    Protein, UA 09/14/2022 Negative  Negative Final    Glucose, UA 09/14/2022 Negative  Negative Final    Ketones, UA 09/14/2022 Negative  Negative Final    Bilirubin (UA)  09/14/2022 Negative  Negative Final    Occult Blood UA 09/14/2022 Negative  Negative Final    Nitrite, UA 09/14/2022 Negative  Negative Final    Urobilinogen, UA 09/14/2022 Negative  <2.0 EU/dL Final    Leukocytes, UA 09/14/2022 Trace (A)  Negative Final    Alcohol, Serum 09/14/2022 <10  <10 mg/dL Final    Acetaminophen (Tylenol), Serum 09/14/2022 <3.0 (L)  10.0 - 20.0 ug/mL Final    Sodium 09/14/2022 139  136 - 145 mmol/L Final    Potassium 09/14/2022 4.1  3.5 - 5.1 mmol/L Final    Chloride 09/14/2022 108  95 - 110 mmol/L Final    CO2 09/14/2022 22 (L)  23 - 29 mmol/L Final    Glucose 09/14/2022 86  70 - 110 mg/dL Final    BUN 09/14/2022 14  6 - 20 mg/dL Final    Creatinine 09/14/2022 0.8  0.5 - 1.4 mg/dL Final    Calcium 09/14/2022 9.2  8.7 - 10.5 mg/dL Final    Total Protein 09/14/2022 7.6  6.0 - 8.4 g/dL Final    Albumin 09/14/2022 3.6  3.5 - 5.2 g/dL Final    Total Bilirubin 09/14/2022 0.3  0.1 - 1.0 mg/dL Final    Alkaline Phosphatase 09/14/2022 111  55 - 135 U/L Final    AST 09/14/2022 46 (H)  10 - 40 U/L Final    ALT 09/14/2022 77 (H)  10 - 44 U/L Final    Anion Gap 09/14/2022 9  8 - 16 mmol/L Final    eGFR 09/14/2022 >60  >60 mL/min/1.73 m^2 Final    TSH 09/14/2022 1.328  0.400 - 4.000 uIU/mL Final    WBC 09/14/2022 11.17  3.90 - 12.70 K/uL Final    RBC 09/14/2022 4.77  4.00 - 5.40 M/uL Final    Hemoglobin 09/14/2022 13.2  12.0 - 16.0 g/dL Final    Hematocrit 09/14/2022 41.7  37.0 - 48.5 % Final    MCV 09/14/2022 87  82 - 98 fL Final    MCH 09/14/2022 27.7  27.0 - 31.0 pg Final    MCHC 09/14/2022 31.7 (L)  32.0 - 36.0 g/dL Final    RDW 09/14/2022 15.6 (H)  11.5 - 14.5 % Final    Platelets 09/14/2022 269  150 - 450 K/uL Final    MPV 09/14/2022 8.7 (L)  9.2 - 12.9 fL Final    Immature Granulocytes 09/14/2022 0.5  0.0 - 0.5 % Final    Gran # (ANC) 09/14/2022 6.3  1.8 - 7.7 K/uL Final    Immature Grans (Abs) 09/14/2022 0.06 (H)  0.00 - 0.04 K/uL Final    Lymph # 09/14/2022 3.5  1.0 - 4.8 K/uL Final    Mono #  09/14/2022 0.7  0.3 - 1.0 K/uL Final    Eos # 09/14/2022 0.5  0.0 - 0.5 K/uL Final    Baso # 09/14/2022 0.08  0.00 - 0.20 K/uL Final    nRBC 09/14/2022 0  0 /100 WBC Final    Gran % 09/14/2022 56.3  38.0 - 73.0 % Final    Lymph % 09/14/2022 31.2  18.0 - 48.0 % Final    Mono % 09/14/2022 6.6  4.0 - 15.0 % Final    Eosinophil % 09/14/2022 4.7  0.0 - 8.0 % Final    Basophil % 09/14/2022 0.7  0.0 - 1.9 % Final    Differential Method 09/14/2022 Automated   Final    Benzodiazepines 09/14/2022 Negative  Negative Final    Methadone metabolites 09/14/2022 Negative  Negative Final    Cocaine (Metab.) 09/14/2022 Negative  Negative Final    Opiate Scrn, Ur 09/14/2022 Negative  Negative Final    Barbiturate Screen, Ur 09/14/2022 Negative  Negative Final    Amphetamine Screen, Ur 09/14/2022 Negative  Negative Final    THC 09/14/2022 Negative  Negative Final    Phencyclidine 09/14/2022 Negative  Negative Final    Creatinine, Urine 09/14/2022 17.7  15.0 - 325.0 mg/dL Final    Toxicology Information 09/14/2022 SEE COMMENT   Final    Salicylate Lvl 09/14/2022 <5.0 (L)  15.0 - 30.0 mg/dL Final    Preg Test, Ur 09/14/2022 Negative   Final    SARS-CoV-2 RNA, Amplification, Qual 09/14/2022 Negative  Negative Final    Valproic Acid Level 09/14/2022 <12.5 (L)  50.0 - 100.0 ug/mL Final    RBC, UA 09/14/2022 1  0 - 4 /hpf Final    WBC, UA 09/14/2022 2  0 - 5 /hpf Final    Squam Epithel, UA 09/14/2022 2  /hpf Final    Microscopic Comment 09/14/2022 SEE COMMENT   Final   Admission on 08/21/2022, Discharged on 08/22/2022   Component Date Value Ref Range Status    WBC 08/21/2022 11.41  3.90 - 12.70 K/uL Final    RBC 08/21/2022 4.78  4.00 - 5.40 M/uL Final    Hemoglobin 08/21/2022 13.1  12.0 - 16.0 g/dL Final    Hematocrit 08/21/2022 41.2  37.0 - 48.5 % Final    MCV 08/21/2022 86  82 - 98 fL Final    MCH 08/21/2022 27.4  27.0 - 31.0 pg Final    MCHC 08/21/2022 31.8 (L)  32.0 - 36.0 g/dL Final    RDW 08/21/2022 15.0 (H)  11.5 - 14.5 % Final     Platelets 08/21/2022 338  150 - 450 K/uL Final    MPV 08/21/2022 8.7 (L)  9.2 - 12.9 fL Final    Immature Granulocytes 08/21/2022 0.3  0.0 - 0.5 % Final    Gran # (ANC) 08/21/2022 7.1  1.8 - 7.7 K/uL Final    Immature Grans (Abs) 08/21/2022 0.03  0.00 - 0.04 K/uL Final    Lymph # 08/21/2022 3.1  1.0 - 4.8 K/uL Final    Mono # 08/21/2022 0.8  0.3 - 1.0 K/uL Final    Eos # 08/21/2022 0.2  0.0 - 0.5 K/uL Final    Baso # 08/21/2022 0.08  0.00 - 0.20 K/uL Final    nRBC 08/21/2022 0  0 /100 WBC Final    Gran % 08/21/2022 62.6  38.0 - 73.0 % Final    Lymph % 08/21/2022 27.2  18.0 - 48.0 % Final    Mono % 08/21/2022 7.3  4.0 - 15.0 % Final    Eosinophil % 08/21/2022 1.9  0.0 - 8.0 % Final    Basophil % 08/21/2022 0.7  0.0 - 1.9 % Final    Differential Method 08/21/2022 Automated   Final    Sodium 08/21/2022 139  136 - 145 mmol/L Final    Potassium 08/21/2022 4.0  3.5 - 5.1 mmol/L Final    Chloride 08/21/2022 105  95 - 110 mmol/L Final    CO2 08/21/2022 23  22 - 31 mmol/L Final    Glucose 08/21/2022 97  70 - 110 mg/dL Final    BUN 08/21/2022 13  7 - 18 mg/dL Final    Creatinine 08/21/2022 0.77  0.50 - 1.40 mg/dL Final    Calcium 08/21/2022 9.3  8.4 - 10.2 mg/dL Final    Total Protein 08/21/2022 8.3  6.0 - 8.4 g/dL Final    Albumin 08/21/2022 4.0  3.5 - 5.2 g/dL Final    Total Bilirubin 08/21/2022 0.4  0.2 - 1.3 mg/dL Final    Alkaline Phosphatase 08/21/2022 177 (H)  38 - 145 U/L Final    AST 08/21/2022 33  14 - 36 U/L Final    ALT 08/21/2022 33  0 - 35 U/L Final    Anion Gap 08/21/2022 11  8 - 16 mmol/L Final    eGFR 08/21/2022 >60  >60 mL/min/1.73 m^2 Final         Discharged Condition: stable and improved; not currently a danger to self/others or gravely disabled    Disposition: Home or Self Care    Is patient being discharged on multiple neuroleptics? No    Follow Up/Patient Instructions:     Take all medications as prescribed.  Attend all psychiatric and medical follow up appointments.   Abstain from all drugs and  alcohol.  Call the crisis line at: [phone] for help in a crisis and emergent situations or call 911 and Return to ED for any acute worsening of your condition including suicidal or homicidal ideations      No discharge procedures on file.        Follow up apt: local [facility] [place]- see SW/discharge notes      Medications:  Reconciled Home Medications:      Medication List        START taking these medications      lithium 300 MG CR tablet  Commonly known as: LITHOBID  Take 1 tablet (300 mg total) by mouth every 12 (twelve) hours.            CHANGE how you take these medications      * QUEtiapine 300 MG Tab  Commonly known as: SEROQUEL  Take 2 tablets (600 mg total) by mouth every evening.  What changed: You were already taking a medication with the same name, and this prescription was added. Make sure you understand how and when to take each.     * QUEtiapine 100 MG Tab  Commonly known as: SEROQUEL  Take 1 tablet (100 mg total) by mouth once daily.  Start taking on: September 22, 2022  What changed:   medication strength  how much to take  when to take this           * This list has 2 medication(s) that are the same as other medications prescribed for you. Read the directions carefully, and ask your doctor or other care provider to review them with you.                STOP taking these medications      chlorproMAZINE 50 MG tablet  Commonly known as: THORAZINE     divalproex 500 MG Tbec  Commonly known as: DEPAKOTE     haloperidoL 5 MG tablet  Commonly known as: HALDOL     paroxetine 20 MG tablet  Commonly known as: PAXIL     sertraline 50 MG tablet  Commonly known as: ZOLOFT     zolpidem 10 mg Tab  Commonly known as: AMBIEN                Diet: regular     Activity as tolerated    Total time spent discharging patient: 35 minutes    Kit Gambino MD  Psychiatry

## 2022-09-21 NOTE — NURSING
Pt anxious and being disruptive on the unit. They were verbally un redirectable. Thorazine 100 mg Po given. Will continue to monitor.

## 2022-09-21 NOTE — CARE UPDATE
[facility]  Encounter Date: 2022  3:02 PM    Discharge Date No discharge date for patient encounter.   Hospital Account: [id number]    MRN: [id number]   Guarantor: RHYS CANELA   Contact Serial #: [id number]         ENCOUNTER             Patient Class: IP Psych   Unit: [facility]   Hospital Service: Psychiatry   Bed: 207   Admitting Provider: Toni Olson Iii, Md   Referring Physician: Toni Olson III   Attending Provider:     Adm Diagnosis: Kristen [F30.9]      PATIENT                  Name: RHYS CANELA : 1995 (27 yrs)   Address: [facility] [address] Sex: Female   City: [place] [address]       Primary Care Provider: Glen Estevez MD         Primary Phone: [phone]   EMERGENCY CONTACT   Contact Name Legal Guardian? Relationship to Patient Home Phone Work Phone Mobile Phone   1. Jasvir Canela  2. marvin bradley      Spouse  Mother           [phone]      GUARANTOR                  Guarantor: RHYS CANELA       : 1995   Address: [facility] [address]    Sex: Female     [place] [address] Guarantor  Type: B/H   Relation to Patient: Self         Home Phone: [phone]   Guarantor ID: [id number]         Work Phone:     GUARANTOR EMPLOYER     Employer:             Status: NOT EMPLO*      COVERAGE          PRIMARY INSURANCE   Payor / Plan: BLUE CROSS BLUE SHIELD/BCBS ALL OUT OF STATE       Group Number: [id number]       Subscriber Name: JASVIR CANELA Subscriber : 1991   Subscriber ID: [id number] Pat. Rel. to Subscriber: Spouse   Insurance Address:  [address] [address]  [place] [address]       SECONDARY INSURANCE   Payor / Plan: MEDICAID/UHC COMMUNITY PLAN [facility] ([place] MEDICAID)       Group Number: [id number]       Subscriber Name: RHYS CANELA Subscriber : 1995   Subscriber ID: [id number] Pat. Rel. to Subscriber: Self   Insurance Address: [address] [address]  [place] [address]          Contact Serial # ([id number])        September 21, 2022    Chart ID ([id number])

## 2022-09-21 NOTE — PSYCH
Patient will be following up with [facility] at [address] [place] [address] in [place], [phone].  Patient will begin going back there on 9/22/2022 at 8:30 am.  Patient does not use tobacco products and had a negative UDS on admit.  AVS faxed to [phone] on 9/21/2022 at 10:24 am.

## 2022-09-21 NOTE — NURSING
"Discharge instructions on medication, self care, suicide crisis hotline, and scheduled follow up appointment with the local mental health clinic. Patient voiced understanding of all discharge teachings. Patient denies SI/HI. Patient denies distress and reports " I am feeling so much better".  Patient is awaiting for transportation per discharge orders.    "

## 2023-06-28 ENCOUNTER — HOSPITAL ENCOUNTER (EMERGENCY)
Facility: HOSPITAL | Age: 28
Discharge: PSYCHIATRIC OR SUBSTANCE USE FACILITY | End: 2023-06-28
Attending: STUDENT IN AN ORGANIZED HEALTH CARE EDUCATION/TRAINING PROGRAM
Payer: COMMERCIAL

## 2023-06-28 VITALS
SYSTOLIC BLOOD PRESSURE: 129 MMHG | WEIGHT: 240.31 LBS | TEMPERATURE: 97 F | RESPIRATION RATE: 18 BRPM | DIASTOLIC BLOOD PRESSURE: 84 MMHG | HEART RATE: 87 BPM | OXYGEN SATURATION: 99 % | BODY MASS INDEX: 38.79 KG/M2

## 2023-06-28 DIAGNOSIS — Z00.8 MEDICAL CLEARANCE FOR PSYCHIATRIC ADMISSION: Primary | ICD-10-CM

## 2023-06-28 DIAGNOSIS — F43.21 GRIEF: ICD-10-CM

## 2023-06-28 LAB
ALBUMIN SERPL BCP-MCNC: 4.1 G/DL (ref 3.5–5.2)
ALP SERPL-CCNC: 96 U/L (ref 55–135)
ALT SERPL W/O P-5'-P-CCNC: 23 U/L (ref 10–44)
AMPHET+METHAMPHET UR QL: NEGATIVE
ANION GAP SERPL CALC-SCNC: 9 MMOL/L (ref 8–16)
APAP SERPL-MCNC: <3 UG/ML (ref 10–20)
AST SERPL-CCNC: 22 U/L (ref 10–40)
B-HCG UR QL: NEGATIVE
BARBITURATES UR QL SCN>200 NG/ML: NEGATIVE
BASOPHILS # BLD AUTO: 0.09 K/UL (ref 0–0.2)
BASOPHILS NFR BLD: 0.9 % (ref 0–1.9)
BENZODIAZ UR QL SCN>200 NG/ML: NEGATIVE
BILIRUB SERPL-MCNC: 0.7 MG/DL (ref 0.1–1)
BILIRUB UR QL STRIP: NEGATIVE
BUN SERPL-MCNC: 7 MG/DL (ref 6–20)
BZE UR QL SCN: NEGATIVE
CALCIUM SERPL-MCNC: 9.5 MG/DL (ref 8.7–10.5)
CANNABINOIDS UR QL SCN: NEGATIVE
CHLORIDE SERPL-SCNC: 104 MMOL/L (ref 95–110)
CLARITY UR: CLEAR
CO2 SERPL-SCNC: 23 MMOL/L (ref 23–29)
COLOR UR: YELLOW
CREAT SERPL-MCNC: 0.8 MG/DL (ref 0.5–1.4)
CREAT UR-MCNC: 96.5 MG/DL (ref 15–325)
DIFFERENTIAL METHOD: ABNORMAL
EOSINOPHIL # BLD AUTO: 0.1 K/UL (ref 0–0.5)
EOSINOPHIL NFR BLD: 1.2 % (ref 0–8)
ERYTHROCYTE [DISTWIDTH] IN BLOOD BY AUTOMATED COUNT: 13 % (ref 11.5–14.5)
EST. GFR  (NO RACE VARIABLE): >60 ML/MIN/1.73 M^2
ETHANOL SERPL-MCNC: <10 MG/DL
GLUCOSE SERPL-MCNC: 130 MG/DL (ref 70–110)
GLUCOSE UR QL STRIP: NEGATIVE
HCT VFR BLD AUTO: 43.4 % (ref 37–48.5)
HGB BLD-MCNC: 14 G/DL (ref 12–16)
HGB UR QL STRIP: NEGATIVE
IMM GRANULOCYTES # BLD AUTO: 0.03 K/UL (ref 0–0.04)
IMM GRANULOCYTES NFR BLD AUTO: 0.3 % (ref 0–0.5)
KETONES UR QL STRIP: NEGATIVE
LEUKOCYTE ESTERASE UR QL STRIP: NEGATIVE
LYMPHOCYTES # BLD AUTO: 3.8 K/UL (ref 1–4.8)
LYMPHOCYTES NFR BLD: 39.1 % (ref 18–48)
MCH RBC QN AUTO: 29.4 PG (ref 27–31)
MCHC RBC AUTO-ENTMCNC: 32.3 G/DL (ref 32–36)
MCV RBC AUTO: 91 FL (ref 82–98)
METHADONE UR QL SCN>300 NG/ML: NEGATIVE
MONOCYTES # BLD AUTO: 0.7 K/UL (ref 0.3–1)
MONOCYTES NFR BLD: 7.3 % (ref 4–15)
NEUTROPHILS # BLD AUTO: 4.9 K/UL (ref 1.8–7.7)
NEUTROPHILS NFR BLD: 51.2 % (ref 38–73)
NITRITE UR QL STRIP: NEGATIVE
NRBC BLD-RTO: 0 /100 WBC
OPIATES UR QL SCN: NEGATIVE
PCP UR QL SCN>25 NG/ML: NEGATIVE
PH UR STRIP: 6 [PH] (ref 5–8)
PLATELET # BLD AUTO: 271 K/UL (ref 150–450)
PMV BLD AUTO: 8.5 FL (ref 9.2–12.9)
POTASSIUM SERPL-SCNC: 3.5 MMOL/L (ref 3.5–5.1)
PROT SERPL-MCNC: 8.5 G/DL (ref 6–8.4)
PROT UR QL STRIP: NEGATIVE
RBC # BLD AUTO: 4.77 M/UL (ref 4–5.4)
SALICYLATES SERPL-MCNC: <5 MG/DL (ref 15–30)
SODIUM SERPL-SCNC: 136 MMOL/L (ref 136–145)
SP GR UR STRIP: 1.02 (ref 1–1.03)
TOXICOLOGY INFORMATION: NORMAL
TSH SERPL DL<=0.005 MIU/L-ACNC: 1 UIU/ML (ref 0.4–4)
URN SPEC COLLECT METH UR: NORMAL
UROBILINOGEN UR STRIP-ACNC: NEGATIVE EU/DL
WBC # BLD AUTO: 9.64 K/UL (ref 3.9–12.7)

## 2023-06-28 PROCEDURE — 85025 COMPLETE CBC W/AUTO DIFF WBC: CPT | Performed by: STUDENT IN AN ORGANIZED HEALTH CARE EDUCATION/TRAINING PROGRAM

## 2023-06-28 PROCEDURE — 63600175 PHARM REV CODE 636 W HCPCS: Performed by: STUDENT IN AN ORGANIZED HEALTH CARE EDUCATION/TRAINING PROGRAM

## 2023-06-28 PROCEDURE — 80053 COMPREHEN METABOLIC PANEL: CPT | Performed by: STUDENT IN AN ORGANIZED HEALTH CARE EDUCATION/TRAINING PROGRAM

## 2023-06-28 PROCEDURE — 80307 DRUG TEST PRSMV CHEM ANLYZR: CPT | Performed by: STUDENT IN AN ORGANIZED HEALTH CARE EDUCATION/TRAINING PROGRAM

## 2023-06-28 PROCEDURE — 81025 URINE PREGNANCY TEST: CPT | Performed by: STUDENT IN AN ORGANIZED HEALTH CARE EDUCATION/TRAINING PROGRAM

## 2023-06-28 PROCEDURE — 82077 ASSAY SPEC XCP UR&BREATH IA: CPT | Performed by: STUDENT IN AN ORGANIZED HEALTH CARE EDUCATION/TRAINING PROGRAM

## 2023-06-28 PROCEDURE — 81003 URINALYSIS AUTO W/O SCOPE: CPT | Mod: 59 | Performed by: STUDENT IN AN ORGANIZED HEALTH CARE EDUCATION/TRAINING PROGRAM

## 2023-06-28 PROCEDURE — 99285 EMERGENCY DEPT VISIT HI MDM: CPT

## 2023-06-28 PROCEDURE — 96372 THER/PROPH/DIAG INJ SC/IM: CPT | Performed by: STUDENT IN AN ORGANIZED HEALTH CARE EDUCATION/TRAINING PROGRAM

## 2023-06-28 PROCEDURE — 36415 COLL VENOUS BLD VENIPUNCTURE: CPT | Performed by: STUDENT IN AN ORGANIZED HEALTH CARE EDUCATION/TRAINING PROGRAM

## 2023-06-28 PROCEDURE — 80179 DRUG ASSAY SALICYLATE: CPT | Performed by: STUDENT IN AN ORGANIZED HEALTH CARE EDUCATION/TRAINING PROGRAM

## 2023-06-28 PROCEDURE — 80143 DRUG ASSAY ACETAMINOPHEN: CPT | Performed by: STUDENT IN AN ORGANIZED HEALTH CARE EDUCATION/TRAINING PROGRAM

## 2023-06-28 PROCEDURE — 84443 ASSAY THYROID STIM HORMONE: CPT | Performed by: STUDENT IN AN ORGANIZED HEALTH CARE EDUCATION/TRAINING PROGRAM

## 2023-06-28 RX ORDER — DIPHENHYDRAMINE HYDROCHLORIDE 50 MG/ML
50 INJECTION INTRAMUSCULAR; INTRAVENOUS EVERY 6 HOURS PRN
Status: DISCONTINUED | OUTPATIENT
Start: 2023-06-28 | End: 2023-06-28 | Stop reason: HOSPADM

## 2023-06-28 RX ORDER — LORAZEPAM 2 MG/ML
2 INJECTION INTRAMUSCULAR
Status: COMPLETED | OUTPATIENT
Start: 2023-06-28 | End: 2023-06-28

## 2023-06-28 RX ORDER — HALOPERIDOL 5 MG/ML
5 INJECTION INTRAMUSCULAR EVERY 6 HOURS PRN
Status: DISCONTINUED | OUTPATIENT
Start: 2023-06-28 | End: 2023-06-28 | Stop reason: HOSPADM

## 2023-06-28 RX ORDER — DIPHENHYDRAMINE HYDROCHLORIDE 50 MG/ML
50 INJECTION INTRAMUSCULAR; INTRAVENOUS EVERY 6 HOURS PRN
Status: DISCONTINUED | OUTPATIENT
Start: 2023-06-28 | End: 2023-06-28

## 2023-06-28 RX ORDER — LORAZEPAM 2 MG/ML
2 INJECTION INTRAMUSCULAR EVERY 6 HOURS PRN
Status: DISCONTINUED | OUTPATIENT
Start: 2023-06-28 | End: 2023-06-28 | Stop reason: HOSPADM

## 2023-06-28 RX ADMIN — LORAZEPAM 2 MG: 2 INJECTION INTRAMUSCULAR; INTRAVENOUS at 12:06

## 2023-06-28 RX ADMIN — DIPHENHYDRAMINE HYDROCHLORIDE 50 MG: 50 INJECTION, SOLUTION INTRAMUSCULAR; INTRAVENOUS at 12:06

## 2023-06-28 RX ADMIN — LORAZEPAM 2 MG: 2 INJECTION INTRAMUSCULAR; INTRAVENOUS at 01:06

## 2023-06-28 RX ADMIN — HALOPERIDOL LACTATE 5 MG: 5 INJECTION, SOLUTION INTRAMUSCULAR at 12:06
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
## 2023-06-29 PROBLEM — R73.9 HYPERGLYCEMIA: Status: ACTIVE | Noted: 2023-06-29

## 2023-07-05 PROBLEM — F31.2 BIPOLAR DISORDER, CURRENT EPISODE MANIC, SEVERE WITH PSYCHOTIC FEATURES: Status: ACTIVE | Noted: 2023-07-05

## 2023-07-05 PROBLEM — F41.1 GAD (GENERALIZED ANXIETY DISORDER): Status: ACTIVE | Noted: 2023-07-05

## 2023-07-05 PROBLEM — F43.10 PTSD (POST-TRAUMATIC STRESS DISORDER): Status: ACTIVE | Noted: 2023-07-05
